# Patient Record
Sex: FEMALE | Race: WHITE | NOT HISPANIC OR LATINO | Employment: FULL TIME | URBAN - METROPOLITAN AREA
[De-identification: names, ages, dates, MRNs, and addresses within clinical notes are randomized per-mention and may not be internally consistent; named-entity substitution may affect disease eponyms.]

---

## 2017-01-23 ENCOUNTER — HOSPITAL ENCOUNTER (OUTPATIENT)
Facility: MEDICAL CENTER | Age: 27
End: 2017-01-23
Attending: PREVENTIVE MEDICINE
Payer: COMMERCIAL

## 2017-01-23 ENCOUNTER — EMPLOYEE HEALTH (OUTPATIENT)
Dept: OCCUPATIONAL MEDICINE | Facility: CLINIC | Age: 27
End: 2017-01-23

## 2017-01-23 ENCOUNTER — EH NON-PROVIDER (OUTPATIENT)
Dept: OCCUPATIONAL MEDICINE | Facility: CLINIC | Age: 27
End: 2017-01-23

## 2017-01-23 VITALS
HEART RATE: 100 BPM | BODY MASS INDEX: 18.96 KG/M2 | WEIGHT: 118 LBS | SYSTOLIC BLOOD PRESSURE: 108 MMHG | RESPIRATION RATE: 12 BRPM | TEMPERATURE: 98.1 F | HEIGHT: 66 IN | OXYGEN SATURATION: 97 % | DIASTOLIC BLOOD PRESSURE: 64 MMHG

## 2017-01-23 DIAGNOSIS — Z02.1 PHYSICAL EXAM, PRE-EMPLOYMENT: ICD-10-CM

## 2017-01-23 DIAGNOSIS — Z02.1 PRE-EMPLOYMENT DRUG SCREENING: ICD-10-CM

## 2017-01-23 DIAGNOSIS — Z02.1 ENCOUNTER FOR PRE-EMPLOYMENT HEALTH SCREENING EXAMINATION: ICD-10-CM

## 2017-01-23 LAB
AMP AMPHETAMINE: NORMAL
BAR BARBITURATES: NORMAL
BZO BENZODIAZEPINES: NORMAL
COC COCAINE: NORMAL
HBV CORE AB SERPL QL IA: NEGATIVE
HBV SURFACE AB SER RIA-ACNC: 250.19 MIU/ML (ref 0–10)
HBV SURFACE AG SERPL QL IA: NEGATIVE
INT CON NEG: NORMAL
INT CON POS: NORMAL
MDMA ECSTASY: NORMAL
MET METHAMPHETAMINES: NORMAL
MTD METHADONE: NORMAL
OPI OPIATES: NORMAL
OXY OXYCODONE: NORMAL
PCP PHENCYCLIDINE: NORMAL
POC URINE DRUG SCREEN OCDRS: NORMAL
RUBV IGG SERPL IA-ACNC: 15 IU/ML
THC: NORMAL

## 2017-01-23 PROCEDURE — 87340 HEPATITIS B SURFACE AG IA: CPT | Performed by: PREVENTIVE MEDICINE

## 2017-01-23 PROCEDURE — 94375 RESPIRATORY FLOW VOLUME LOOP: CPT | Performed by: PREVENTIVE MEDICINE

## 2017-01-23 PROCEDURE — 86735 MUMPS ANTIBODY: CPT | Performed by: PREVENTIVE MEDICINE

## 2017-01-23 PROCEDURE — 86765 RUBEOLA ANTIBODY: CPT | Performed by: PREVENTIVE MEDICINE

## 2017-01-23 PROCEDURE — 80305 DRUG TEST PRSMV DIR OPT OBS: CPT | Performed by: PREVENTIVE MEDICINE

## 2017-01-23 PROCEDURE — 99204 OFFICE O/P NEW MOD 45 MIN: CPT | Performed by: PREVENTIVE MEDICINE

## 2017-01-23 PROCEDURE — 86706 HEP B SURFACE ANTIBODY: CPT | Performed by: PREVENTIVE MEDICINE

## 2017-01-23 PROCEDURE — 86704 HEP B CORE ANTIBODY TOTAL: CPT | Performed by: PREVENTIVE MEDICINE

## 2017-01-23 PROCEDURE — 86787 VARICELLA-ZOSTER ANTIBODY: CPT | Performed by: PREVENTIVE MEDICINE

## 2017-01-23 PROCEDURE — 86480 TB TEST CELL IMMUN MEASURE: CPT | Performed by: PREVENTIVE MEDICINE

## 2017-01-23 PROCEDURE — 86762 RUBELLA ANTIBODY: CPT | Performed by: PREVENTIVE MEDICINE

## 2017-01-23 NOTE — MR AVS SNAPSHOT
Patricia Novoa   2017 10:20 AM   Employee Health   MRN: [id number]    Department:  [facility]   Dept Phone:  [phone]    Description:  Female : 1990   Provider:  Dl Steinberg D.O.           Allergies as of 2017     Not on File      You were diagnosed with     Physical exam, pre-employment   [810993]         Basic Information     Date Of Birth Sex Race Ethnicity Preferred Language    1990 Female White Non- English      Health Maintenance     Patient has no pending health maintenance at this time      Current Immunizations     No immunizations on file.      Below and/or attached are the medications your provider expects you to take. Review all of your home medications and newly ordered medications with your provider and/or pharmacist. Follow medication instructions as directed by your provider and/or pharmacist. Please keep your medication list with you and share with your provider. Update the information when medications are discontinued, doses are changed, or new medications (including over-the-counter products) are added; and carry medication information at all times in the event of emergency situations     Allergies:  (Not on file)          Medications  Valid as of: 2017 - 10:45 AM    Generic Name Brand Name Tablet Size Instructions for use    .                 Medicines prescribed today were sent to:     None      Medication refill instructions:       If your prescription bottle indicates you have medication refills left, it is not necessary to call your provider’s office. Please contact your pharmacy and they will refill your medication.    If your prescription bottle indicates you do not have any refills left, you may request refills at any time through one of the following ways: The online [employer] system (except Urgent Care), by calling your provider’s office, or by asking your pharmacy to contact your provider’s office with a refill request.  Medication refills are processed only during regular business hours and may not be available until the next business day. Your provider may request additional information or to have a follow-up visit with you prior to refilling your medication.   *Please Note: Medication refills are assigned a new Rx number when refilled electronically. Your pharmacy may indicate that no refills were authorized even though a new prescription for the same medication is available at the pharmacy. Please request the medicine by name with the pharmacy before contacting your provider for a refill.           Wayna Access Code: Activation code not generated  Current Wayna Status: Active

## 2017-01-25 LAB
M TB TUBERC IFN-G/MITOGEN IGNF BLD: -0
M TB TUBERC IGNF/MITOGEN IGNF CONTROL: 68.6 [IU]/ML
MEV IGG SER IA-ACNC: 76.7 AU/ML
MITOGEN IGNF BCKGRD COR BLD-ACNC: 0.04 [IU]/ML
MUV IGG SER IA-ACNC: 44.6 AU/ML
QUANT TB GOLD 86480: NEGATIVE
VZV IGG SER IA-ACNC: 219 IV

## 2017-02-08 LAB
ABO GROUP BLD: NORMAL
BLD GP AB SCN SERPL QL: NEGATIVE
HBV SURFACE AG SERPL QL IA: NEGATIVE
HCT VFR BLD AUTO: 41.9 %
HGB BLD-MCNC: 14.1 G/DL
HIV 1 0 2 IC ZHVIC: NON REACTIVE
RH BLD: POSITIVE
RPR SER QL: NON REACTIVE
RUBV IGG SERPL IA-ACNC: NORMAL

## 2017-02-09 LAB
C TRACH DNA SPEC QL NAA+PROBE: NEGATIVE
N GONORRHOEA DNA SPEC QL NAA+PROBE: NEGATIVE

## 2017-02-17 LAB — PHYSICIAN READ PAP  881411: NEGATIVE

## 2017-04-07 ENCOUNTER — INITIAL PRENATAL (OUTPATIENT)
Dept: OBGYN | Facility: MEDICAL CENTER | Age: 27
End: 2017-04-07
Payer: COMMERCIAL

## 2017-04-07 VITALS — SYSTOLIC BLOOD PRESSURE: 100 MMHG | WEIGHT: 135 LBS | DIASTOLIC BLOOD PRESSURE: 62 MMHG | BODY MASS INDEX: 21.8 KG/M2

## 2017-04-07 DIAGNOSIS — Z34.02 ENCOUNTER FOR SUPERVISION OF NORMAL FIRST PREGNANCY IN SECOND TRIMESTER: ICD-10-CM

## 2017-04-07 PROCEDURE — 59401 PR NEW OB VISIT: CPT | Performed by: OBSTETRICS & GYNECOLOGY

## 2017-04-07 NOTE — PROGRESS NOTES
Cc: New OB visit    HPI:  The patient is a 27 y.o.  19w2d based upon  Patient's last menstrual period was 2016. Pt received 2-3 visits from [facility] with 2 US and labs.    She presents for her new obstetric visit.  She reports  reports  fetal movement,  denies  vaginal bleeding,  denies  leakage of fluid,  denies contractions.   She denies nausea/vomiting, denies headache, and denies dysuria.      OB History    Para Term  AB SAB TAB Ectopic Multiple Living   1               # Outcome Date GA Lbr [place]/2nd Weight Sex Delivery Anes PTL Lv   1 Current                 History reviewed. No pertinent past medical history.  History reviewed. No pertinent past surgical history.  Social History     Social History   • Marital Status: Single     Spouse Name: N/A   • Number of Children: N/A   • Years of Education: N/A     Occupational History   • Not on file.     Social History Main Topics   • Smoking status: Not on file   • Smokeless tobacco: Not on file   • Alcohol Use: Not on file   • Drug Use: Not on file   • Sexual Activity: Not on file     Other Topics Concern   • Not on file     Social History Narrative   • No narrative on file     History reviewed. No pertinent family history.  Allergies:   Allergies as of 2017   • (No Known Allergies)       PE:    Blood pressure 100/62, weight 61.236 kg (135 lb), last menstrual period 2016.    SVE: deferred     see prenatal physical    LABS: Pending    A/P:  27 y.o.  19w2d based upon  Patient's last menstrual period was 2016..  She is here for her new obstetric visit.    1. Encounter for supervision of normal first pregnancy in second trimester  US-OB 2ND 3RD TRI COMPLETE       1. Ultrasound for dates and anatomy to be scheduled in 1-2 weeks.  2. 1st trimester screening for Down Syndrome and neural tube defects performed per patient report and will obtain records from [facility].  3.  PTL precautions  4.  NOB packet given with ACOG prenatal  book.  5.  Increase water intake and encouraged healthy nutrition.  6.  Referral to MFM made (if needed).  7.  Begin prenatal vitamins.  8.  Follow-up in 4 weeks.

## 2017-04-07 NOTE — PROGRESS NOTES
NOB today, pt is a transfer of care from [place]. Records have been requested today. Pt states has had pap GC/CT and PNP done.

## 2017-04-07 NOTE — MR AVS SNAPSHOT
Patricia Bright   2017 1:30 PM   Initial Prenatal   MRN: [id number]    Department:  [facility]   Dept Phone:  [phone]    Description:  Female : 1990   Provider:  Shikha Lawson M.D.           Allergies as of 2017     No Known Allergies      You were diagnosed with     Encounter for supervision of normal first pregnancy in second trimester   [[id number]]         Vital Signs     Blood Pressure Weight Last Menstrual Period             100/62 mmHg 61.236 kg (135 lb) 2016         Basic Information     Date Of Birth Sex Race Ethnicity Preferred Language    1990 Female White Non- English      Your appointments     May 04, 2017  1:45 PM   OB Follow Up with Shikha Lawson M.D.   [facility])    [address]  [place] [address] [phone]            2017  4:15 PM   OB Follow Up with Shikha Lawson M.D.   [facility])    [address]  [place] [address]   [phone]              Health Maintenance        Date Due Completion Dates    IMM HEP B VACCINE (1 of 3 - Primary Series) 1990 ---    IMM HEP A VACCINE (1 of 2 - Standard Series) 2/15/1992 ---    IMM HPV VACCINE (1 of 3 - Female 3 Dose Series) 2/15/2002 ---    IMM VARICELLA (CHICKENPOX) VACCINE (1 of 2 - 2 Dose Adolescent Series) 2/15/2004 ---    IMM DTaP/Tdap/Td Vaccine (1 - Tdap) 2/15/2010 ---    PAP SMEAR 2/15/2012 ---            Current Immunizations     Influenza TIV (IM) 10/23/2016      Below and/or attached are the medications your provider expects you to take. Review all of your home medications and newly ordered medications with your provider and/or pharmacist. Follow medication instructions as directed by your provider and/or pharmacist. Please keep your medication list with you and share with your provider. Update the information when medications are discontinued, doses are  changed, or new medications (including over-the-counter products) are added; and carry medication information at all times in the event of emergency situations     Allergies:  No Known Allergies          Medications  Valid as of: April 07, 2017 -  2:26 PM    Generic Name Brand Name Tablet Size Instructions for use    .                 Medicines prescribed today were sent to:     [id number] - [place], [place] - [address]    [address] [place] [address]    Phone: [phone] Fax: [phone]    Open 24 Hours?: No      Medication refill instructions:       If your prescription bottle indicates you have medication refills left, it is not necessary to call your provider’s office. Please contact your pharmacy and they will refill your medication.    If your prescription bottle indicates you do not have any refills left, you may request refills at any time through one of the following ways: The online [employer]il[employer] system (except Urgent Care), by calling your provider’s office, or by asking your pharmacy to contact your provider’s office with a refill request. Medication refills are processed only during regular business hours and may not be available until the next business day. Your provider may request additional information or to have a follow-up visit with you prior to refilling your medication.   *Please Note: Medication refills are assigned a new Rx number when refilled electronically. Your pharmacy may indicate that no refills were authorized even though a new prescription for the same medication is available at the pharmacy. Please request the medicine by name with the pharmacy before contacting your provider for a refill.        Your To Do List     Future Labs/Procedures Complete By Expires    US-OB 2ND 3RD TRI COMPLETE  As directed 10/6/2017         Agilvax Access Code: Activation code not generated  Current Agilvax Status: Active

## 2017-04-28 ENCOUNTER — DATING (OUTPATIENT)
Dept: OBGYN | Facility: MEDICAL CENTER | Age: 27
End: 2017-04-28

## 2017-04-28 ENCOUNTER — HOSPITAL ENCOUNTER (OUTPATIENT)
Dept: RADIOLOGY | Facility: MEDICAL CENTER | Age: 27
End: 2017-04-28
Attending: OBSTETRICS & GYNECOLOGY
Payer: COMMERCIAL

## 2017-04-28 DIAGNOSIS — Z34.02 ENCOUNTER FOR SUPERVISION OF NORMAL FIRST PREGNANCY IN SECOND TRIMESTER: ICD-10-CM

## 2017-04-28 PROCEDURE — 76805 OB US >/= 14 WKS SNGL FETUS: CPT

## 2017-05-04 ENCOUNTER — ROUTINE PRENATAL (OUTPATIENT)
Dept: OBGYN | Facility: MEDICAL CENTER | Age: 27
End: 2017-05-04
Payer: COMMERCIAL

## 2017-05-04 VITALS — DIASTOLIC BLOOD PRESSURE: 66 MMHG | WEIGHT: 140 LBS | BODY MASS INDEX: 22.61 KG/M2 | SYSTOLIC BLOOD PRESSURE: 100 MMHG

## 2017-05-04 DIAGNOSIS — Z34.82 ENCOUNTER FOR SUPERVISION OF OTHER NORMAL PREGNANCY IN SECOND TRIMESTER: ICD-10-CM

## 2017-05-04 PROCEDURE — 90040 PR PRENATAL FOLLOW UP: CPT | Performed by: OBSTETRICS & GYNECOLOGY

## 2017-05-04 NOTE — PROGRESS NOTES
Pt denies CTX, LOF, VB or any other c/o.  Good fetal movement.    Lab:No results found for this or any previous visit (from the past 672 hour(s)).    A/P:  27 y.o.  at 23w1d presents for obstetric follow-up.    1.  Continue prenatal vitamins.  2.  Begin/continue kick counts at 28 weeks   3.  Watch weight gain.  4.  Increase water intake.  5.  Follow-up in 4 weeks.  6.  PTL/labor precautions given

## 2017-05-04 NOTE — MR AVS SNAPSHOT
Patricia Novoa   2017 1:45 PM   Routine Prenatal   MRN: [id number]    Department:  [facility]   Dept Phone:  [phone]    Description:  Female : 1990   Provider:  Shikha Lawson M.D.           Allergies as of 2017     No Known Allergies      You were diagnosed with     Encounter for supervision of other normal pregnancy in second trimester   [[id number]]         Vital Signs     Blood Pressure Weight Last Menstrual Period             100/66 mmHg 63.504 kg (140 lb) 2016         Basic Information     Date Of Birth Sex Race Ethnicity Preferred Language    1990 Female White Non- English      Your appointments     2017  4:15 PM   OB Follow Up with Shikha Lawson M.D.   [facility]    [address]  [facility] [id number] [phone]              Health Maintenance        Date Due Completion Dates    IMM HEP B VACCINE (1 of 3 - Primary Series) 1990 ---    IMM HEP A VACCINE (1 of 2 - Standard Series) 2/15/1992 ---    IMM HPV VACCINE (1 of 3 - Female 3 Dose Series) 2/15/2002 ---    IMM VARICELLA (CHICKENPOX) VACCINE (1 of 2 - 2 Dose Adolescent Series) 2/15/2004 ---    IMM DTaP/Tdap/Td Vaccine (1 - Tdap) 2/15/2010 ---    PAP SMEAR 2/15/2012 ---            Current Immunizations     Influenza TIV (IM) 10/23/2016      Below and/or attached are the medications your provider expects you to take. Review all of your home medications and newly ordered medications with your provider and/or pharmacist. Follow medication instructions as directed by your provider and/or pharmacist. Please keep your medication list with you and share with your provider. Update the information when medications are discontinued, doses are changed, or new medications (including over-the-counter products) are added; and carry medication information at all times in the event of emergency situations     Allergies:  No Known Allergies             Medications  Valid as of: May 04, 2017 -  2:16 PM    Generic Name Brand Name Tablet Size Instructions for use    .                 Medicines prescribed today were sent to:     [facility] - [place], [place] - [address]    [address] [place] [address]    Phone: [phone] Fax: [phone]    Open 24 Hours?: No      Medication refill instructions:       If your prescription bottle indicates you have medication refills left, it is not necessary to call your provider’s office. Please contact your pharmacy and they will refill your medication.    If your prescription bottle indicates you do not have any refills left, you may request refills at any time through one of the following ways: The online [employer] system (except Urgent Care), by calling your provider’s office, or by asking your pharmacy to contact your provider’s office with a refill request. Medication refills are processed only during regular business hours and may not be available until the next business day. Your provider may request additional information or to have a follow-up visit with you prior to refilling your medication.   *Please Note: Medication refills are assigned a new Rx number when refilled electronically. Your pharmacy may indicate that no refills were authorized even though a new prescription for the same medication is available at the pharmacy. Please request the medicine by name with the pharmacy before contacting your provider for a refill.        Your To Do List     Future Labs/Procedures Complete By Expires    CBC WITHOUT DIFFERENTIAL  As directed 11/4/2017    GLUCOSE 1HR GESTATIONAL  As directed 5/5/2018    T.PALLIDUM AB EIA  As directed 5/5/2018      Instructions    PTL precautions          Geodruidhart Access Code: Activation code not generated  Current CyberSponse Status: Active

## 2017-05-12 ENCOUNTER — HOSPITAL ENCOUNTER (OUTPATIENT)
Dept: LAB | Facility: MEDICAL CENTER | Age: 27
End: 2017-05-12
Attending: OBSTETRICS & GYNECOLOGY
Payer: COMMERCIAL

## 2017-05-12 DIAGNOSIS — Z34.82 ENCOUNTER FOR SUPERVISION OF OTHER NORMAL PREGNANCY IN SECOND TRIMESTER: ICD-10-CM

## 2017-05-12 LAB
ERYTHROCYTE [DISTWIDTH] IN BLOOD BY AUTOMATED COUNT: 45.5 FL (ref 35.9–50)
GLUCOSE 1H P 50 G GLC PO SERPL-MCNC: 130 MG/DL (ref 70–139)
HCT VFR BLD AUTO: 35.8 % (ref 37–47)
HGB BLD-MCNC: 12.1 G/DL (ref 12–16)
MCH RBC QN AUTO: 31 PG (ref 27–33)
MCHC RBC AUTO-ENTMCNC: 33.8 G/DL (ref 33.6–35)
MCV RBC AUTO: 91.8 FL (ref 81.4–97.8)
PLATELET # BLD AUTO: 232 K/UL (ref 164–446)
PMV BLD AUTO: 9.9 FL (ref 9–12.9)
RBC # BLD AUTO: 3.9 M/UL (ref 4.2–5.4)
TREPONEMA PALLIDUM IGG+IGM AB [PRESENCE] IN SERUM OR PLASMA BY IMMUNOASSAY: NON REACTIVE
WBC # BLD AUTO: 7.8 K/UL (ref 4.8–10.8)

## 2017-05-12 PROCEDURE — 82950 GLUCOSE TEST: CPT

## 2017-05-12 PROCEDURE — 86780 TREPONEMA PALLIDUM: CPT

## 2017-05-12 PROCEDURE — 85027 COMPLETE CBC AUTOMATED: CPT

## 2017-05-12 PROCEDURE — 36415 COLL VENOUS BLD VENIPUNCTURE: CPT

## 2017-06-02 ENCOUNTER — ROUTINE PRENATAL (OUTPATIENT)
Dept: OBGYN | Facility: MEDICAL CENTER | Age: 27
End: 2017-06-02
Payer: COMMERCIAL

## 2017-06-02 VITALS — WEIGHT: 145 LBS | DIASTOLIC BLOOD PRESSURE: 60 MMHG | SYSTOLIC BLOOD PRESSURE: 100 MMHG | BODY MASS INDEX: 23.41 KG/M2

## 2017-06-02 DIAGNOSIS — Z34.03 ENCOUNTER FOR SUPERVISION OF NORMAL FIRST PREGNANCY IN THIRD TRIMESTER: ICD-10-CM

## 2017-06-02 PROCEDURE — 90040 PR PRENATAL FOLLOW UP: CPT | Performed by: OBSTETRICS & GYNECOLOGY

## 2017-06-02 RX ORDER — BREAST PUMP
1 EACH MISCELLANEOUS DAILY
Qty: 1 EACH | Refills: 0 | Status: SHIPPED | OUTPATIENT
Start: 2017-06-02

## 2017-06-02 NOTE — Clinical Note
"Count Your Baby's Movements  Another step to a healthy delivery    How Many Weeks Pregnant?  28 weeks   Date to Begin Countin17              How to use this chart    One way for your physician to keep track of your baby's health is by knowing how often the baby moves (or \"kicks\") in your womb.  You can help your physician to do this by using this chart every day.    Every day, you should see how many hours it takes for your baby to move 10 times.  Start in the morning, as soon as you get up.    · First, write down the time your baby moves until you get to 10.  · Check off one box every time your baby moves until you get to 10.  · Write down the time you finished counting in the last column.  · Total how long it took to count up all 10 movements.  · Finally, fill in the box that shows how long this took.  After counting 10 movements, you no longer have to count any more that day.  The next morning, just start counting again as soon as you get up.    What should you call a \"movement\"?  It is hard to say, because it will feel different from one mother to another and from one pregnancy to the next.  The important thing is that you count the movements the same way throughout your pregnancy.  If you have more questions, you should ask your physician.    Count carefully every day!  SAMPLE:  Week 28    How many hours did it take to feel 10 movements?       Start  Time     1     2     3     4     5     6     7     8     9     10   Finish Time   Mon 8:20 ·  ·  ·  ·  ·  ·  ·  ·  ·  ·  10:40   Tu               Fri               Sat               Sun                 IMPORTANT: You should contact your physician if it takes more than two hours for you to feel 10 movements.  Each morning, write down the time and start to count the movements of your baby.  Keep track by checking off one box every time you feel one movement.  When you have felt 10 \"kicks\", write down the time you finished " counting in the last column.  Then fill in the   box (over the check noah) for the number of hours it took.  Be sure to read the complete instructions on the previous page.

## 2017-06-02 NOTE — PROGRESS NOTES
Pt here today for OB follow up, good fetal movement, denies LOF, VB. Fetal movement sheet given today.

## 2017-06-02 NOTE — MR AVS SNAPSHOT
Patricia Bright   2017 4:15 PM   Routine Prenatal   MRN: [id number]    Department:  [facility]   Dept Phone:  [phone]    Description:  Female : 1990   Provider:  Shikha Lawson M.D.           Allergies as of 2017     No Known Allergies      You were diagnosed with     Lactating mother   [[id number]]         Vital Signs     Blood Pressure Weight Last Menstrual Period             100/60 mmHg 65.772 kg (145 lb) 2016         Basic Information     Date Of Birth Sex Race Ethnicity Preferred Language    1990 Female White Non- English      Your appointments     2017  3:45 PM   OB Follow Up with Shikha Lawson M.D.   [facility]    [address]  [place] [address]   [phone]            2017  4:00 PM   OB Follow Up with Shikha Lawson M.D.   [facility]    [address]  [place] [address]   [phone]            2017  4:15 PM   OB Follow Up with Shikha Lawson M.D.   [facility]    [address]  [place] [address]   [phone]            Aug 02, 2017 10:30 AM   OB Follow Up with Shikha Lawson M.D.   [facility]    [address]  [place] [address]   [phone]            Aug 10, 2017  4:15 PM   OB Follow Up with Shikha Lawson M.D.   [facility]    [address]  [place] [address]   [phone]            Aug 17, 2017  3:30 PM   OB Follow Up with Shikha Lawson M.D.   [facility])    [address]  [place] [address]   [phone]            Aug 23, 2017  4:00 PM   OB Follow Up with Shikha Lawson M.D.   [facility]    [address]  [place]  [place] [address]   [phone]              Health Maintenance        Date Due Completion Dates    IMM HEP B VACCINE (1 of 3 - Primary Series) 1990 ---    IMM HEP A VACCINE (1 of 2 - Standard Series) 2/15/1992 ---    IMM HPV VACCINE (1 of 3 - Female 3 Dose Series) 2/15/2002 ---    IMM VARICELLA (CHICKENPOX) VACCINE (1 of 2 - 2 Dose Adolescent Series) 2/15/2004 ---    IMM DTaP/Tdap/Td Vaccine (1 - Tdap) 2/15/2010 ---    PAP SMEAR 2/17/2020 2/17/2017, 2/7/2017 (Done)    Override on 2/7/2017: Done (mari - WNL)            Current Immunizations     Influenza TIV (IM) 10/23/2016      Below and/or attached are the medications your provider expects you to take. Review all of your home medications and newly ordered medications with your provider and/or pharmacist. Follow medication instructions as directed by your provider and/or pharmacist. Please keep your medication list with you and share with your provider. Update the information when medications are discontinued, doses are changed, or new medications (including over-the-counter products) are added; and carry medication information at all times in the event of emergency situations     Allergies:  No Known Allergies          Medications  Valid as of: June 02, 2017 -  4:54 PM    Generic Name Brand Name Tablet Size Instructions for use    Misc. Devices (Misc) Breast Pump  1 Each by Other route every day. Double Electric Pump, Medically Necessary   Z39.1        .                 Medicines prescribed today were sent to:     [id number]  [place], [place] - [address]    [address] [address]    Phone: [phone] Fax: [phone]    Open 24 Hours?: No      Medication refill instructions:       If your prescription bottle indicates you have medication refills left, it is not necessary to call your provider’s office. Please contact your pharmacy and they will refill your medication.    If your prescription bottle indicates you do not have any refills  left, you may request refills at any time through one of the following ways: The online Diana system (except Urgent Care), by calling your provider’s office, or by asking your pharmacy to contact your provider’s office with a refill request. Medication refills are processed only during regular business hours and may not be available until the next business day. Your provider may request additional information or to have a follow-up visit with you prior to refilling your medication.   *Please Note: Medication refills are assigned a new Rx number when refilled electronically. Your pharmacy may indicate that no refills were authorized even though a new prescription for the same medication is available at the pharmacy. Please request the medicine by name with the pharmacy before contacting your provider for a refill.           Diana Access Code: Activation code not generated  Current Diana Status: Active

## 2017-06-03 NOTE — PROGRESS NOTES
Pt denies CTX, LOF, VB or any other c/o.  Good fetal movement.    Lab:  Recent Results (from the past 672 hour(s))   GLUCOSE 1HR GESTATIONAL    Collection Time: 17 11:54 AM   Result Value Ref Range    Glucose, Post Dose 130 70 - 139 mg/dL   CBC WITHOUT DIFFERENTIAL    Collection Time: 17 11:54 AM   Result Value Ref Range    WBC 7.8 4.8 - 10.8 K/uL    RBC 3.90 (L) 4.20 - 5.40 M/uL    Hemoglobin 12.1 12.0 - 16.0 g/dL    Hematocrit 35.8 (L) 37.0 - 47.0 %    MCV 91.8 81.4 - 97.8 fL    MCH 31.0 27.0 - 33.0 pg    MCHC 33.8 33.6 - 35.0 g/dL    RDW 45.5 35.9 - 50.0 fL    Platelet Count 232 164 - 446 K/uL    MPV 9.9 9.0 - 12.9 fL   T.PALLIDUM AB EIA    Collection Time: 17 11:54 AM   Result Value Ref Range    Syphilis, Treponemal Qual Non Reactive Non Reactive       A/P:  27 y.o.  at 27w2d presents for obstetric follow-up.    1.  Continue prenatal vitamins.  2.  Begin/continue kick counts at 28 weeks   3.  Watch weight gain.  4.  Increase water intake.  5.  Follow-up in 2 weeks.  6.  PTL/labor precautions given

## 2017-06-29 ENCOUNTER — ROUTINE PRENATAL (OUTPATIENT)
Dept: OBGYN | Facility: MEDICAL CENTER | Age: 27
End: 2017-06-29
Payer: COMMERCIAL

## 2017-06-29 VITALS — WEIGHT: 149 LBS | SYSTOLIC BLOOD PRESSURE: 100 MMHG | DIASTOLIC BLOOD PRESSURE: 70 MMHG | BODY MASS INDEX: 24.06 KG/M2

## 2017-06-29 DIAGNOSIS — Z34.03 ENCOUNTER FOR SUPERVISION OF NORMAL FIRST PREGNANCY IN THIRD TRIMESTER: ICD-10-CM

## 2017-06-29 PROCEDURE — 90040 PR PRENATAL FOLLOW UP: CPT | Performed by: OBSTETRICS & GYNECOLOGY

## 2017-06-29 NOTE — MR AVS SNAPSHOT
Patricia Bright   2017 3:45 PM   Routine Prenatal   MRN: [id number]    Department:  [facility]   Dept Phone:  [phone]    Description:  Female : 1990   Provider:  Shikha Lawson M.D.           Allergies as of 2017     No Known Allergies      You were diagnosed with     Encounter for supervision of normal first pregnancy in third trimester   [207641]         Vital Signs     Blood Pressure Weight Last Menstrual Period             100/70 mmHg 67.586 kg (149 lb) 2016         Basic Information     Date Of Birth Sex Race Ethnicity Preferred Language    1990 Female White Non- English      Your appointments     2017  4:00 PM   OB Follow Up with Shikha Lawson M.D.   [facility])    [address]  [place] [address]   [phone]            2017  3:15 PM   OB Follow Up with Anh Valerio M.D.   [address], [address]  [place] [address]   [phone]            Aug 02, 2017 10:30 AM   OB Follow Up with Shikha Lawson M.D.   [facility]    [address]  [place] [address]   [phone]            Aug 10, 2017  8:00 AM   OB Follow Up with Shikha Lawson M.D.   [facility]    [address]  [place] [address]   [phone]            Aug 17, 2017  3:30 PM   OB Follow Up with Shikha Lawson M.D.   [facility])    [address]  [place] [address]   [phone]            Aug 23, 2017  4:00 PM   OB Follow Up with Shikah Lawson M.D.   [facility]    [address]  [place] [address]   [phone]              Problem List              ICD-10-CM Priority Class Noted - Resolved    Encounter for supervision of  normal first pregnancy in third trimester Z34.03   6/2/2017 - Present      Health Maintenance        Date Due Completion Dates    IMM HEP B VACCINE (1 of 3 - Primary Series) 1990 ---    IMM HEP A VACCINE (1 of 2 - Standard Series) 2/15/1992 ---    IMM HPV VACCINE (1 of 3 - Female 3 Dose Series) 2/15/2002 ---    IMM VARICELLA (CHICKENPOX) VACCINE (1 of 2 - 2 Dose Adolescent Series) 2/15/2004 ---    IMM DTaP/Tdap/Td Vaccine (1 - Tdap) 2/15/2010 ---    PAP SMEAR 2/17/2020 2/17/2017, 2/7/2017 (Done)    Override on 2/7/2017: Done (mari - WNL)            Current Immunizations     Influenza TIV (IM) 10/23/2016      Below and/or attached are the medications your provider expects you to take. Review all of your home medications and newly ordered medications with your provider and/or pharmacist. Follow medication instructions as directed by your provider and/or pharmacist. Please keep your medication list with you and share with your provider. Update the information when medications are discontinued, doses are changed, or new medications (including over-the-counter products) are added; and carry medication information at all times in the event of emergency situations     Allergies:  No Known Allergies          Medications  Valid as of: June 29, 2017 -  4:07 PM    Generic Name Brand Name Tablet Size Instructions for use    Misc. Devices (Misc) Breast Pump  1 Each by Other route every day. Double Electric Pump, Medically Necessary   Z39.1        .                 Medicines prescribed today were sent to:     [facility]  [place], [place] - [address] [facility] [address] [facility]    [address] [facility] [address]    Phone: [phone] Fax: [phone]    Open 24 Hours?: No      Medication refill instructions:       If your prescription bottle indicates you have medication refills left, it is not necessary to call your provider’s office. Please contact your pharmacy and they will refill your medication.    If your prescription bottle  indicates you do not have any refills left, you may request refills at any time through one of the following ways: The online [employer] system (except Urgent Care), by calling your provider’s office, or by asking your pharmacy to contact your provider’s office with a refill request. Medication refills are processed only during regular business hours and may not be available until the next business day. Your provider may request additional information or to have a follow-up visit with you prior to refilling your medication.   *Please Note: Medication refills are assigned a new Rx number when refilled electronically. Your pharmacy may indicate that no refills were authorized even though a new prescription for the same medication is available at the pharmacy. Please request the medicine by name with the pharmacy before contacting your provider for a refill.        Instructions    PTL precautions          [employer] Access Code: Activation code not generated  Current [employer] Status: Active

## 2017-06-29 NOTE — PROGRESS NOTES
Pt denies CTX, LOF, VB or any other c/o.  Good fetal movement.    Lab:No results found for this or any previous visit (from the past 672 hour(s)).    A/P:  27 y.o.  at 31w1d presents for obstetric follow-up.    1.  Continue prenatal vitamins.  2.  Begin/continue kick counts at 28 weeks   3.  Watch weight gain.  4.  Increase water intake.  5.  Follow-up in 2 weeks.  6.  PTL/labor precautions given

## 2017-07-14 ENCOUNTER — ROUTINE PRENATAL (OUTPATIENT)
Dept: OBGYN | Facility: MEDICAL CENTER | Age: 27
End: 2017-07-14
Payer: COMMERCIAL

## 2017-07-14 VITALS — BODY MASS INDEX: 24.22 KG/M2 | SYSTOLIC BLOOD PRESSURE: 100 MMHG | DIASTOLIC BLOOD PRESSURE: 58 MMHG | WEIGHT: 150 LBS

## 2017-07-14 DIAGNOSIS — Z34.93 NORMAL PREGNANCY, THIRD TRIMESTER: ICD-10-CM

## 2017-07-14 PROBLEM — Z34.90 NORMAL PREGNANCY: Status: ACTIVE | Noted: 2017-07-14

## 2017-07-14 PROCEDURE — 90040 PR PRENATAL FOLLOW UP: CPT | Performed by: OBSTETRICS & GYNECOLOGY

## 2017-07-14 NOTE — PROGRESS NOTES
Pt denies CTX, LOF, VB or any other c/o.  Good fetal movement.    Lab:No results found for this or any previous visit (from the past 672 hour(s)).    A/P:  27 y.o.  at 33w2d presents for obstetric follow-up.    1.  Continue prenatal vitamins.  2.  Begin/continue kick counts at 28 weeks   3.  Watch weight gain.  4.  Increase water intake.  5.  Follow-up in 2 weeks.  6.  PTL/labor precautions given

## 2017-07-14 NOTE — MR AVS SNAPSHOT
Patricia Bright   2017 4:00 PM   Routine Prenatal   MRN: [id number]    Department:  [facility]   Dept Phone:  [phone]    Description:  Female : 1990   Provider:  Shikha Lawson M.D.           Allergies as of 2017     No Known Allergies      You were diagnosed with     Normal pregnancy, third trimester   [[id number]]         Vital Signs     Blood Pressure Weight Last Menstrual Period             100/58 mmHg 68.04 kg (150 lb) 2016         Basic Information     Date Of Birth Sex Race Ethnicity Preferred Language    1990 Female White Non- English      Your appointments     2017  3:15 PM   OB Follow Up with Anh Valerio M.D.   [address]  [place] [address]   [phone]            Aug 02, 2017 10:30 AM   OB Follow Up with Shikha Lawson M.D.   [facility]    [address]  [place] [address]   [phone]            Aug 17, 2017  3:30 PM   OB Follow Up with Shikha Lawson M.D.   [facility]    [address]  [place] [address]   [phone]            Aug 23, 2017  4:00 PM   OB Follow Up with Shikha Lawson M.D.   [facility]    [address]  [place] [address]   [phone]              Problem List              ICD-10-CM Priority Class Noted - Resolved    Encounter for supervision of normal first pregnancy in third trimester Z34.03   2017 - Present    Normal pregnancy Z34.90   2017 - Present      Health Maintenance        Date Due Completion Dates    IMM HEP B VACCINE (1 of 3 - Primary Series) 1990 ---    IMM HEP A VACCINE (1 of 2 - Standard Series) 2/15/1992 ---    IMM HPV VACCINE (1 of 3 - Female 3 Dose Series) 2/15/2002 ---    IMM VARICELLA (CHICKENPOX) VACCINE (1 of 2 - 2 Dose Adolescent  Series) 2/15/2004 ---    IMM DTaP/Tdap/Td Vaccine (1 - Tdap) 2/15/2010 ---    IMM INFLUENZA (1) 9/1/2017 10/23/2016    PAP SMEAR 2/17/2020 2/17/2017, 2/7/2017 (Done)    Override on 2/7/2017: Done (mari - WNL)            Current Immunizations     Influenza TIV (IM) 10/23/2016      Below and/or attached are the medications your provider expects you to take. Review all of your home medications and newly ordered medications with your provider and/or pharmacist. Follow medication instructions as directed by your provider and/or pharmacist. Please keep your medication list with you and share with your provider. Update the information when medications are discontinued, doses are changed, or new medications (including over-the-counter products) are added; and carry medication information at all times in the event of emergency situations     Allergies:  No Known Allergies          Medications  Valid as of: July 14, 2017 -  4:27 PM    Generic Name Brand Name Tablet Size Instructions for use    Misc. Devices (Misc) Breast Pump  1 Each by Other route every day. Double Electric Pump, Medically Necessary   Z39.1        .                 Medicines prescribed today were sent to:     [facility] [place] - [address] [facility] [facility]    [address] [facility] [place] [address]    Phone: [phone] Fax: [phone]    Open 24 Hours?: No      Medication refill instructions:       If your prescription bottle indicates you have medication refills left, it is not necessary to call your provider’s office. Please contact your pharmacy and they will refill your medication.    If your prescription bottle indicates you do not have any refills left, you may request refills at any time through one of the following ways: The online [employer] system (except Urgent Care), by calling your provider’s office, or by asking your pharmacy to contact your provider’s office with a refill request. Medication refills are processed only during regular business  hours and may not be available until the next business day. Your provider may request additional information or to have a follow-up visit with you prior to refilling your medication.   *Please Note: Medication refills are assigned a new Rx number when refilled electronically. Your pharmacy may indicate that no refills were authorized even though a new prescription for the same medication is available at the pharmacy. Please request the medicine by name with the pharmacy before contacting your provider for a refill.        Instructions    PTL precautions          [employer] Access Code: Activation code not generated  Current [employer] Status: Active

## 2017-07-28 ENCOUNTER — HOSPITAL ENCOUNTER (OUTPATIENT)
Facility: MEDICAL CENTER | Age: 27
End: 2017-07-28
Attending: OBSTETRICS & GYNECOLOGY
Payer: COMMERCIAL

## 2017-07-28 ENCOUNTER — ROUTINE PRENATAL (OUTPATIENT)
Dept: OBGYN | Facility: CLINIC | Age: 27
End: 2017-07-28
Payer: COMMERCIAL

## 2017-07-28 VITALS
DIASTOLIC BLOOD PRESSURE: 60 MMHG | BODY MASS INDEX: 24.78 KG/M2 | HEIGHT: 66 IN | WEIGHT: 154.2 LBS | SYSTOLIC BLOOD PRESSURE: 100 MMHG

## 2017-07-28 DIAGNOSIS — Z34.03 SUPERVISION OF NORMAL FIRST PREGNANCY IN THIRD TRIMESTER: ICD-10-CM

## 2017-07-28 PROCEDURE — 87653 STREP B DNA AMP PROBE: CPT

## 2017-07-28 PROCEDURE — 90040 PR PRENATAL FOLLOW UP: CPT | Performed by: OBSTETRICS & GYNECOLOGY

## 2017-07-28 NOTE — MR AVS SNAPSHOT
"        Patricia Bright   2017 3:15 PM   Routine Prenatal   MRN: [id number]    Department:  [facility]   Dept Phone:  [phone]    Description:  Female : 1990   Provider:  Hayder Ordaz M.D.           Allergies as of 2017     No Known Allergies      You were diagnosed with     Supervision of normal first pregnancy in third trimester   [[id number]]         Vital Signs     Blood Pressure Height Weight Body Mass Index Last Menstrual Period       100/60 mmHg 1.676 m (5' 6\") 69.945 kg (154 lb 3.2 oz) 24.90 kg/m2 2016       Basic Information     Date Of Birth Sex Race Ethnicity Preferred Language    1990 Female White Non- English      Your appointments     Aug 02, 2017 10:30 AM   OB Follow Up with Shikha Lawson M.D.   [facility])    [address]  [place] [address]   [phone]            Aug 17, 2017  3:30 PM   OB Follow Up with Shikha Lawson M.D.   [facility])    [address]  [place] [address] [phone]            Aug 23, 2017  4:00 PM   OB Follow Up with Shikha Lawson M.D.   [facility]    [address]  [place] [address] [phone]              Problem List              ICD-10-CM Priority Class Noted - Resolved    Encounter for supervision of normal first pregnancy in third trimester Z34.03   2017 - Present    Normal pregnancy Z34.90   2017 - Present      Health Maintenance        Date Due Completion Dates    IMM HEP B VACCINE (1 of 3 - Primary Series) 1990 ---    IMM HEP A VACCINE (1 of 2 - Standard Series) 2/15/1992 ---    IMM HPV VACCINE (1 of 3 - Female 3 Dose Series) 2/15/2002 ---    IMM VARICELLA (CHICKENPOX) VACCINE (1 of 2 - 2 Dose Adolescent Series) 2/15/2004 ---    IMM DTaP/Tdap/Td Vaccine (1 - Tdap) 2/15/2010 ---    IMM INFLUENZA (1) 2017 10/23/2016  "    PAP SMEAR 2/17/2020 2/17/2017, 2/7/2017 (Done)    Override on 2/7/2017: Done (mari - WNL)            Current Immunizations     Influenza TIV (IM) 10/23/2016      Below and/or attached are the medications your provider expects you to take. Review all of your home medications and newly ordered medications with your provider and/or pharmacist. Follow medication instructions as directed by your provider and/or pharmacist. Please keep your medication list with you and share with your provider. Update the information when medications are discontinued, doses are changed, or new medications (including over-the-counter products) are added; and carry medication information at all times in the event of emergency situations     Allergies:  No Known Allergies          Medications  Valid as of: July 28, 2017 -  4:08 PM    Generic Name Brand Name Tablet Size Instructions for use    Misc. Devices (Misc) Breast Pump  1 Each by Other route every day. Double Electric Pump, Medically Necessary   Z39.1        .                 Medicines prescribed today were sent to:     [facility] [address] [facility], [place] - [address] [facility]    [address] [facility] [place] [address]    Phone: [phone] Fax: [phone]    Open 24 Hours?: No      Medication refill instructions:       If your prescription bottle indicates you have medication refills left, it is not necessary to call your provider’s office. Please contact your pharmacy and they will refill your medication.    If your prescription bottle indicates you do not have any refills left, you may request refills at any time through one of the following ways: The online [email or web address] system (except Urgent Care), by calling your provider’s office, or by asking your pharmacy to contact your provider’s office with a refill request. Medication refills are processed only during regular business hours and may not be available until the next business day. Your provider may request additional information or to  have a follow-up visit with you prior to refilling your medication.   *Please Note: Medication refills are assigned a new Rx number when refilled electronically. Your pharmacy may indicate that no refills were authorized even though a new prescription for the same medication is available at the pharmacy. Please request the medicine by name with the pharmacy before contacting your provider for a refill.        Your To Do List     Future Labs/Procedures Complete By Expires    GRP B STREP, BY PCR (ZAPATA BROTH)  As directed 7/28/2018      Other Notes About Your Plan     Breast Feeding   Epidural : possible            MyChart Access Code: Activation code not generated  Current MyChart Status: Active

## 2017-07-28 NOTE — PROGRESS NOTES
"Patient is at 35w2d .no complaints  Fetal Movement : positive       Patients' weight gain, fluid intake and exercise level discussed.Vitals, fundal height , fetal position, and FHR reviewed on flowsheet.    .../60 mmHg  Ht 1.676 m (5' 6\")  Wt 69.945 kg (154 lb 3.2 oz)  BMI 24.90 kg/m2  LMP 11/23/2016  History reviewed. No pertinent past medical history.  Patient Active Problem List    Diagnosis Date Noted   • Normal pregnancy 07/14/2017   • Encounter for supervision of normal first pregnancy in third trimester 06/02/2017         Lab:No results found for this or any previous visit (from the past 336 hour(s)).    Assessment:  1  35w2d  2. . Doing well  3. Size equals Dates and/or Scan  4. Weight gain: normal: No, excessive:Yes                        Plan.  1. Rediscuss diet.  2. Increase water intake PRN  3. Continue vitamins.  4. Kick counts as instructed.  5. Discuss support hose and proper shoe wear as indicated.  6. GBS   "

## 2017-07-29 DIAGNOSIS — Z34.03 SUPERVISION OF NORMAL FIRST PREGNANCY IN THIRD TRIMESTER: ICD-10-CM

## 2017-07-30 LAB — GP B STREP DNA SPEC QL NAA+PROBE: NEGATIVE

## 2017-08-02 ENCOUNTER — ROUTINE PRENATAL (OUTPATIENT)
Dept: OBGYN | Facility: MEDICAL CENTER | Age: 27
End: 2017-08-02
Payer: COMMERCIAL

## 2017-08-02 VITALS
SYSTOLIC BLOOD PRESSURE: 100 MMHG | DIASTOLIC BLOOD PRESSURE: 58 MMHG | HEIGHT: 66 IN | BODY MASS INDEX: 24.64 KG/M2 | WEIGHT: 153.3 LBS

## 2017-08-02 DIAGNOSIS — Z34.93 NORMAL PREGNANCY, THIRD TRIMESTER: ICD-10-CM

## 2017-08-02 PROCEDURE — 90040 PR PRENATAL FOLLOW UP: CPT | Performed by: OBSTETRICS & GYNECOLOGY

## 2017-08-02 NOTE — MR AVS SNAPSHOT
"        Patricia Bright   2017 10:30 AM   Routine Prenatal   MRN: [id number]    Department:  [facility]   Dept Phone:  [phone]    Description:  Female : 1990   Provider:  Shikha Lawson M.D.           Allergies as of 2017     No Known Allergies      You were diagnosed with     Normal pregnancy, third trimester   [[id number]]         Vital Signs     Blood Pressure Height Weight Body Mass Index Last Menstrual Period       100/58 mmHg 1.676 m (5' 6\") 69.536 kg (153 lb 4.8 oz) 24.75 kg/m2 2016       Basic Information     Date Of Birth Sex Race Ethnicity Preferred Language    1990 Female White Non- English      Your appointments     Aug 11, 2017  2:15 PM   OB Follow Up with Brady Cottrell M.D.   [address], [address]  [place] [address]   [phone]            Aug 17, 2017  3:30 PM   OB Follow Up with Shikha Lawson M.D.   [facility])    [address]  [place] [address] [phone]            Aug 23, 2017  4:00 PM   OB Follow Up with Shikha Lawson M.D.   [facility])    [address]  [place] [address] [phone]              Problem List              ICD-10-CM Priority Class Noted - Resolved    Encounter for supervision of normal first pregnancy in third trimester Z34.03   2017 - Present    Normal pregnancy Z34.90   2017 - Present      Health Maintenance        Date Due Completion Dates    IMM HEP B VACCINE (1 of 3 - Primary Series) 1990 ---    IMM HEP A VACCINE (1 of 2 - Standard Series) 2/15/1992 ---    IMM HPV VACCINE (1 of 3 - Female 3 Dose Series) 2/15/2002 ---    IMM VARICELLA (CHICKENPOX) VACCINE (1 of 2 - 2 Dose Adolescent Series) 2/15/2004 ---    IMM DTaP/Tdap/Td Vaccine (1 - Tdap) 2/15/2010 ---    IMM INFLUENZA (1) 2017 10/23/2016    PAP SMEAR 2020, " 2/7/2017 (Done)    Override on 2/7/2017: Done (mari - WNL)            Current Immunizations     Influenza TIV (IM) 10/23/2016      Below and/or attached are the medications your provider expects you to take. Review all of your home medications and newly ordered medications with your provider and/or pharmacist. Follow medication instructions as directed by your provider and/or pharmacist. Please keep your medication list with you and share with your provider. Update the information when medications are discontinued, doses are changed, or new medications (including over-the-counter products) are added; and carry medication information at all times in the event of emergency situations     Allergies:  No Known Allergies          Medications  Valid as of: August 02, 2017 - 11:14 AM    Generic Name Brand Name Tablet Size Instructions for use    Misc. Devices (Misc) Breast Pump  1 Each by Other route every day. Double Electric Pump, Medically Necessary   Z39.1        .                 Medicines prescribed today were sent to:     [facility] - [place], [place] - [address] [facility]    [address] [facility] [place] [address]    Phone: [phone] Fax: [phone]    Open 24 Hours?: No      Medication refill instructions:       If your prescription bottle indicates you have medication refills left, it is not necessary to call your provider’s office. Please contact your pharmacy and they will refill your medication.    If your prescription bottle indicates you do not have any refills left, you may request refills at any time through one of the following ways: The online [employer] system (except Urgent Care), by calling your provider’s office, or by asking your pharmacy to contact your provider’s office with a refill request. Medication refills are processed only during regular business hours and may not be available until the next business day. Your provider may request additional information or to have a follow-up visit with you  prior to refilling your medication.   *Please Note: Medication refills are assigned a new Rx number when refilled electronically. Your pharmacy may indicate that no refills were authorized even though a new prescription for the same medication is available at the pharmacy. Please request the medicine by name with the pharmacy before contacting your provider for a refill.        Instructions    PTL precautions       Other Notes About Your Plan     Breast Feeding   Epidural : possible            MyChart Access Code: Activation code not generated  Current MyChart Status: Active

## 2017-08-02 NOTE — PROGRESS NOTES
Pt denies CTX, LOF, VB or any other c/o.  Good fetal movement.    Lab:  Recent Results (from the past 672 hour(s))   GRP B STREP, BY PCR (ZAPATA BROTH)    Collection Time: 17  4:08 PM   Result Value Ref Range    Strep Gp B DNA PCR Negative Negative       A/P:  27 y.o.  at 36w0d presents for obstetric follow-up.    1.  Continue prenatal vitamins.  2.  Begin/continue kick counts at 28 weeks   3.  Watch weight gain.  4.  Increase water intake.  5.  Follow-up in 1 weeks.  6.  PTL/labor precautions given

## 2017-08-11 ENCOUNTER — ROUTINE PRENATAL (OUTPATIENT)
Dept: OBGYN | Facility: CLINIC | Age: 27
End: 2017-08-11
Payer: COMMERCIAL

## 2017-08-11 VITALS — DIASTOLIC BLOOD PRESSURE: 68 MMHG | BODY MASS INDEX: 25.19 KG/M2 | WEIGHT: 156 LBS | SYSTOLIC BLOOD PRESSURE: 110 MMHG

## 2017-08-11 DIAGNOSIS — Z34.93 NORMAL PREGNANCY, THIRD TRIMESTER: ICD-10-CM

## 2017-08-11 PROCEDURE — 90040 PR PRENATAL FOLLOW UP: CPT | Performed by: OBSTETRICS & GYNECOLOGY

## 2017-08-11 NOTE — PROGRESS NOTES
OB Followup;    37w2d    Patient Active Problem List    Diagnosis Date Noted   • Normal pregnancy 07/14/2017   • Encounter for supervision of normal first pregnancy in third trimester 06/02/2017       Filed Vitals:    08/11/17 1429   BP: 110/68   Weight: 70.761 kg (156 lb)       Patient presents for followup of OB care. Currently doing well . Good fetal movement no leakage of fluid no contractions or vaginal bleeding        Size equals dates, normal fetal heart rate      Labs; GBS negative    Labor precautions given  Increase oral hydration    Followup in  1 weeks

## 2017-08-11 NOTE — MR AVS SNAPSHOT
Patricia Bright   2017 2:15 PM   Routine Prenatal   MRN: [id number]    Department:  [facility]   Dept Phone:  [phone]    Description:  Female : 1990   Provider:  Brady Cottrell M.D.           Allergies as of 2017     No Known Allergies      You were diagnosed with     Normal pregnancy, third trimester   [5896155]         Vital Signs     Blood Pressure Weight Last Menstrual Period             110/68 mmHg 70.761 kg (156 lb) 2016         Basic Information     Date Of Birth Sex Race Ethnicity Preferred Language    1990 Female White Non- English      Your appointments     Aug 17, 2017  3:30 PM   OB Follow Up with Shikha Lawson M.D.   [facility])    [address]  [place] [address] [phone]            Aug 23, 2017  4:00 PM   OB Follow Up with Shikha Lawson M.D.   [facility])    [address]  [place] [address] [phone]              Problem List              ICD-10-CM Priority Class Noted - Resolved    Encounter for supervision of normal first pregnancy in third trimester Z34.03   2017 - Present    Normal pregnancy Z34.90   2017 - Present      Health Maintenance        Date Due Completion Dates    IMM HEP B VACCINE (1 of 3 - Primary Series) 1990 ---    IMM HEP A VACCINE (1 of 2 - Standard Series) 2/15/1992 ---    IMM HPV VACCINE (1 of 3 - Female 3 Dose Series) 2/15/2002 ---    IMM VARICELLA (CHICKENPOX) VACCINE (1 of 2 - 2 Dose Adolescent Series) 2/15/2004 ---    IMM DTaP/Tdap/Td Vaccine (1 - Tdap) 2/15/2010 ---    IMM INFLUENZA (1) 2017 10/23/2016    PAP SMEAR 2020, 2017 (Done)    Override on 2017: Done (mari - WNL)            Current Immunizations     Influenza TIV (IM) 10/23/2016      Below and/or attached are the medications your provider expects you to take. Review all of your  home medications and newly ordered medications with your provider and/or pharmacist. Follow medication instructions as directed by your provider and/or pharmacist. Please keep your medication list with you and share with your provider. Update the information when medications are discontinued, doses are changed, or new medications (including over-the-counter products) are added; and carry medication information at all times in the event of emergency situations     Allergies:  No Known Allergies          Medications  Valid as of: August 11, 2017 -  2:55 PM    Generic Name Brand Name Tablet Size Instructions for use    Misc. Devices (Misc) Breast Pump  1 Each by Other route every day. Double Electric Pump, Medically Necessary   Z39.1        .                 Medicines prescribed today were sent to:     [facility] - [place], [place] - [address] [facility]    [address] [place] [address]    Phone: [phone] Fax: [phone]    Open 24 Hours?: No      Medication refill instructions:       If your prescription bottle indicates you have medication refills left, it is not necessary to call your provider’s office. Please contact your pharmacy and they will refill your medication.    If your prescription bottle indicates you do not have any refills left, you may request refills at any time through one of the following ways: The online [employer] system (except Urgent Care), by calling your provider’s office, or by asking your pharmacy to contact your provider’s office with a refill request. Medication refills are processed only during regular business hours and may not be available until the next business day. Your provider may request additional information or to have a follow-up visit with you prior to refilling your medication.   *Please Note: Medication refills are assigned a new Rx number when refilled electronically. Your pharmacy may indicate that no refills were authorized even though a new prescription for the same  medication is available at the pharmacy. Please request the medicine by name with the pharmacy before contacting your provider for a refill.        Other Notes About Your Plan     Breast Feeding   Epidural : possible            MyChart Access Code: Activation code not generated  Current MyChart Status: Active

## 2017-08-17 ENCOUNTER — ROUTINE PRENATAL (OUTPATIENT)
Dept: OBGYN | Facility: MEDICAL CENTER | Age: 27
End: 2017-08-17
Payer: COMMERCIAL

## 2017-08-17 VITALS — DIASTOLIC BLOOD PRESSURE: 66 MMHG | WEIGHT: 155 LBS | SYSTOLIC BLOOD PRESSURE: 104 MMHG | BODY MASS INDEX: 25.03 KG/M2

## 2017-08-17 DIAGNOSIS — Z34.03 ENCOUNTER FOR SUPERVISION OF NORMAL FIRST PREGNANCY IN THIRD TRIMESTER: ICD-10-CM

## 2017-08-17 PROCEDURE — 90040 PR PRENATAL FOLLOW UP: CPT | Performed by: OBSTETRICS & GYNECOLOGY

## 2017-08-17 NOTE — PROGRESS NOTES
Pt denies CTX, LOF, VB or any other c/o.  Good fetal movement.    Lab:  Recent Results (from the past 672 hour(s))   GRP B STREP, BY PCR (ZAPATA BROTH)    Collection Time: 17  4:08 PM   Result Value Ref Range    Strep Gp B DNA PCR Negative Negative       A/P:  27 y.o.  at 38w1d presents for obstetric follow-up.    1.  Continue prenatal vitamins.  2.  Begin/continue kick counts at 28 weeks   3.  Watch weight gain.  4.  Increase water intake.  5.  Follow-up in 1 weeks.  6.  PTL/labor precautions given

## 2017-08-17 NOTE — Clinical Note
August 17, 2017       Patient: Patricia Novoa   YOB: 1990   Date of Visit: 8/17/2017         To Whom It May Concern:    Patricia Novoa desires to start her maternity leave 8/17/17.    If you have any questions or concerns, please don't hesitate to call [phone]          Sincerely,          Shikha Lawson M.D.  Electronically Signed

## 2017-08-17 NOTE — MR AVS SNAPSHOT
Patricia Novoa   2017 3:30 PM   Routine Prenatal   MRN: [id number]    Department:  [facility]   Dept Phone:  [phone]    Description:  Female : 1990   Provider:  Shikha Lawson M.D.           Allergies as of 2017     No Known Allergies      You were diagnosed with     Encounter for supervision of normal first pregnancy in third trimester   [[id number]]         Vital Signs     Blood Pressure Weight Last Menstrual Period             104/66 mmHg 70.308 kg (155 lb) 2016         Basic Information     Date Of Birth Sex Race Ethnicity Preferred Language    1990 Female White Non- English      Your appointments     Aug 23, 2017  4:00 PM   OB Follow Up with Shikha Lawson M.D.   [facility]    [address]  [facility] [id number] [phone]              Problem List              ICD-10-CM Priority Class Noted - Resolved    Encounter for supervision of normal first pregnancy in third trimester Z34.03   2017 - Present    Normal pregnancy Z34.90   2017 - Present      Health Maintenance        Date Due Completion Dates    IMM HEP B VACCINE (1 of 3 - Primary Series) 1990 ---    IMM HEP A VACCINE (1 of 2 - Standard Series) 2/15/1992 ---    IMM HPV VACCINE (1 of 3 - Female 3 Dose Series) 2/15/2002 ---    IMM VARICELLA (CHICKENPOX) VACCINE (1 of 2 - 2 Dose Adolescent Series) 2/15/2004 ---    IMM DTaP/Tdap/Td Vaccine (1 - Tdap) 2/15/2010 ---    IMM INFLUENZA (1) 2017 10/23/2016    PAP SMEAR 2020, 2017 (Done)    Override on 2017: Done (mari - WNL)            Current Immunizations     Influenza TIV (IM) 10/23/2016      Below and/or attached are the medications your provider expects you to take. Review all of your home medications and newly ordered medications with your provider and/or pharmacist. Follow medication instructions as directed by your provider and/or pharmacist. Please  keep your medication list with you and share with your provider. Update the information when medications are discontinued, doses are changed, or new medications (including over-the-counter products) are added; and carry medication information at all times in the event of emergency situations     Allergies:  No Known Allergies          Medications  Valid as of: August 17, 2017 -  4:19 PM    Generic Name Brand Name Tablet Size Instructions for use    Misc. Devices (Misc) Breast Pump  1 Each by Other route every day. Double Electric Pump, Medically Necessary   Z39.1        .                 Medicines prescribed today were sent to:     [facility] - [place], [place] - [address] [facility]    [address] [facility] [place] [address]    Phone: [phone] Fax: [phone]    Open 24 Hours?: No      Medication refill instructions:       If your prescription bottle indicates you have medication refills left, it is not necessary to call your provider’s office. Please contact your pharmacy and they will refill your medication.    If your prescription bottle indicates you do not have any refills left, you may request refills at any time through one of the following ways: The online [employer] system (except Urgent Care), by calling your provider’s office, or by asking your pharmacy to contact your provider’s office with a refill request. Medication refills are processed only during regular business hours and may not be available until the next business day. Your provider may request additional information or to have a follow-up visit with you prior to refilling your medication.   *Please Note: Medication refills are assigned a new Rx number when refilled electronically. Your pharmacy may indicate that no refills were authorized even though a new prescription for the same medication is available at the pharmacy. Please request the medicine by name with the pharmacy before contacting your provider for a refill.        Instructions    Labor  precautions       Other Notes About Your Plan     Breast Feeding   Epidural : possible            MyChart Access Code: Activation code not generated  Current MyChart Status: Active

## 2017-08-23 ENCOUNTER — ROUTINE PRENATAL (OUTPATIENT)
Dept: OBGYN | Facility: MEDICAL CENTER | Age: 27
End: 2017-08-23
Payer: COMMERCIAL

## 2017-08-23 VITALS — DIASTOLIC BLOOD PRESSURE: 66 MMHG | BODY MASS INDEX: 25.19 KG/M2 | SYSTOLIC BLOOD PRESSURE: 100 MMHG | WEIGHT: 156 LBS

## 2017-08-23 DIAGNOSIS — Z34.03 ENCOUNTER FOR SUPERVISION OF NORMAL FIRST PREGNANCY IN THIRD TRIMESTER: ICD-10-CM

## 2017-08-23 PROCEDURE — 90040 PR PRENATAL FOLLOW UP: CPT | Performed by: OBSTETRICS & GYNECOLOGY

## 2017-08-23 NOTE — PROGRESS NOTES
Pt denies CTX, LOF, VB or any other c/o.  Good fetal movement.    Lab:  Recent Results (from the past 672 hour(s))   GRP B STREP, BY PCR (ZAPATA BROTH)    Collection Time: 17  4:08 PM   Result Value Ref Range    Strep Gp B DNA PCR Negative Negative       A/P:  27 y.o.  at 39w0d presents for obstetric follow-up.    1.  Continue prenatal vitamins.  2.  Begin/continue kick counts at 28 weeks   3.  Watch weight gain.  4.  Increase water intake.  5.  Follow-up in 1 weeks.  6.  PTL/labor precautions given  7.  Pt desires IOL

## 2017-08-23 NOTE — MR AVS SNAPSHOT
Patricia Novoa   2017 4:00 PM   Routine Prenatal   MRN: [id number]    Department:  [facility]   Dept Phone:  [phone]    Description:  Female : 1990   Provider:  Shikha Lawson M.D.           Allergies as of 2017     No Known Allergies      You were diagnosed with     Encounter for supervision of normal first pregnancy in third trimester   [[id number]]         Vital Signs     Blood Pressure Weight Last Menstrual Period             100/66 mmHg 70.761 kg (156 lb) 2016         Basic Information     Date Of Birth Sex Race Ethnicity Preferred Language    1990 Female White Non- English      Problem List              ICD-10-CM Priority Class Noted - Resolved    Encounter for supervision of normal first pregnancy in third trimester Z34.03   2017 - Present    Normal pregnancy Z34.90   2017 - Present      Health Maintenance        Date Due Completion Dates    IMM HEP B VACCINE (1 of 3 - Primary Series) 1990 ---    IMM HEP A VACCINE (1 of 2 - Standard Series) 2/15/1992 ---    IMM HPV VACCINE (1 of 3 - Female 3 Dose Series) 2/15/2002 ---    IMM VARICELLA (CHICKENPOX) VACCINE (1 of 2 - 2 Dose Adolescent Series) 2/15/2004 ---    IMM DTaP/Tdap/Td Vaccine (1 - Tdap) 2/15/2010 ---    IMM INFLUENZA (1) 2017 10/23/2016    PAP SMEAR 2020, 2017 (Done)    Override on 2017: Done (mari - WNL)            Current Immunizations     Influenza TIV (IM) 10/23/2016      Below and/or attached are the medications your provider expects you to take. Review all of your home medications and newly ordered medications with your provider and/or pharmacist. Follow medication instructions as directed by your provider and/or pharmacist. Please keep your medication list with you and share with your provider. Update the information when medications are discontinued, doses are changed, or new medications (including over-the-counter products) are added; and carry  medication information at all times in the event of emergency situations     Allergies:  No Known Allergies          Medications  Valid as of: August 23, 2017 -  4:19 PM    Generic Name Brand Name Tablet Size Instructions for use    Misc. Devices (Misc) Breast Pump  1 Each by Other route every day. Double Electric Pump, Medically Necessary   Z39.1        .                 Medicines prescribed today were sent to:     [facility] - [place], [place] - [address]    [address] [place] [address]    Phone: [phone] Fax: [phone]    Open 24 Hours?: No      Medication refill instructions:       If your prescription bottle indicates you have medication refills left, it is not necessary to call your provider’s office. Please contact your pharmacy and they will refill your medication.    If your prescription bottle indicates you do not have any refills left, you may request refills at any time through one of the following ways: The online [employer] system (except Urgent Care), by calling your provider’s office, or by asking your pharmacy to contact your provider’s office with a refill request. Medication refills are processed only during regular business hours and may not be available until the next business day. Your provider may request additional information or to have a follow-up visit with you prior to refilling your medication.   *Please Note: Medication refills are assigned a new Rx number when refilled electronically. Your pharmacy may indicate that no refills were authorized even though a new prescription for the same medication is available at the pharmacy. Please request the medicine by name with the pharmacy before contacting your provider for a refill.        Your To Do List     Future Labs/Procedures Complete By Expires    INDUCTION OF LABOR  As directed 8/23/2018      Instructions    Induction sent         Other Notes About Your Plan     Breast Feeding   Epidural : possible            MyChart  Access Code: Activation code not generated  Current MyChart Status: Active

## 2017-08-30 ENCOUNTER — ROUTINE PRENATAL (OUTPATIENT)
Dept: OBGYN | Facility: MEDICAL CENTER | Age: 27
End: 2017-08-30
Payer: COMMERCIAL

## 2017-08-30 VITALS — DIASTOLIC BLOOD PRESSURE: 56 MMHG | SYSTOLIC BLOOD PRESSURE: 88 MMHG | WEIGHT: 156 LBS | BODY MASS INDEX: 25.18 KG/M2

## 2017-08-30 DIAGNOSIS — Z34.03 ENCOUNTER FOR SUPERVISION OF NORMAL FIRST PREGNANCY IN THIRD TRIMESTER: ICD-10-CM

## 2017-08-30 PROCEDURE — 90040 PR PRENATAL FOLLOW UP: CPT | Performed by: OBSTETRICS & GYNECOLOGY

## 2017-08-30 NOTE — PROGRESS NOTES
Pt here today for OB follow up, good fetal movement, denies LOF, VB. Pt has been given IOL instructions.

## 2017-08-30 NOTE — PROGRESS NOTES
Pt denies CTX, LOF, VB or any other c/o.  Good fetal movement.    Lab:No results found for this or any previous visit (from the past 672 hour(s)).    A/P:  27 y.o.  at 40w0d presents for obstetric follow-up.    1.  Continue prenatal vitamins.  2.  Begin/continue kick counts at 28 weeks   3.  Watch weight gain.  4.  Increase water intake.  5.  IOL on 17  6.  PTL/labor precautions given

## 2017-09-01 ENCOUNTER — HOSPITAL ENCOUNTER (OUTPATIENT)
Facility: MEDICAL CENTER | Age: 27
End: 2017-09-01
Attending: OBSTETRICS & GYNECOLOGY | Admitting: OBSTETRICS & GYNECOLOGY
Payer: COMMERCIAL

## 2017-09-01 VITALS — HEIGHT: 66 IN | WEIGHT: 156 LBS | BODY MASS INDEX: 25.07 KG/M2

## 2017-09-01 PROCEDURE — 59025 FETAL NON-STRESS TEST: CPT | Performed by: OBSTETRICS & GYNECOLOGY

## 2017-09-02 ENCOUNTER — HOSPITAL ENCOUNTER (INPATIENT)
Facility: MEDICAL CENTER | Age: 27
LOS: 3 days | End: 2017-09-05
Attending: OBSTETRICS & GYNECOLOGY | Admitting: OBSTETRICS & GYNECOLOGY
Payer: COMMERCIAL

## 2017-09-02 LAB
ERYTHROCYTE [DISTWIDTH] IN BLOOD BY AUTOMATED COUNT: 43 FL (ref 35.9–50)
HCT VFR BLD AUTO: 37.7 % (ref 37–47)
HGB BLD-MCNC: 12.6 G/DL (ref 12–16)
HOLDING TUBE BB 8507: NORMAL
MCH RBC QN AUTO: 29.2 PG (ref 27–33)
MCHC RBC AUTO-ENTMCNC: 33.4 G/DL (ref 33.6–35)
MCV RBC AUTO: 87.3 FL (ref 81.4–97.8)
PLATELET # BLD AUTO: 228 K/UL (ref 164–446)
PMV BLD AUTO: 10.9 FL (ref 9–12.9)
RBC # BLD AUTO: 4.32 M/UL (ref 4.2–5.4)
WBC # BLD AUTO: 11.2 K/UL (ref 4.8–10.8)

## 2017-09-02 PROCEDURE — 85027 COMPLETE CBC AUTOMATED: CPT

## 2017-09-02 PROCEDURE — 700101 HCHG RX REV CODE 250

## 2017-09-02 PROCEDURE — 700105 HCHG RX REV CODE 258: Performed by: OBSTETRICS & GYNECOLOGY

## 2017-09-02 PROCEDURE — 770002 HCHG ROOM/CARE - OB PRIVATE (112)

## 2017-09-02 PROCEDURE — 10H07YZ INSERTION OF OTHER DEVICE INTO PRODUCTS OF CONCEPTION, VIA NATURAL OR ARTIFICIAL OPENING: ICD-10-PCS | Performed by: OBSTETRICS & GYNECOLOGY

## 2017-09-02 PROCEDURE — 700105 HCHG RX REV CODE 258

## 2017-09-02 PROCEDURE — 700111 HCHG RX REV CODE 636 W/ 250 OVERRIDE (IP)

## 2017-09-02 PROCEDURE — 3E033VJ INTRODUCTION OF OTHER HORMONE INTO PERIPHERAL VEIN, PERCUTANEOUS APPROACH: ICD-10-PCS | Performed by: OBSTETRICS & GYNECOLOGY

## 2017-09-02 RX ORDER — SODIUM CHLORIDE, SODIUM LACTATE, POTASSIUM CHLORIDE, CALCIUM CHLORIDE 600; 310; 30; 20 MG/100ML; MG/100ML; MG/100ML; MG/100ML
INJECTION, SOLUTION INTRAVENOUS CONTINUOUS
Status: DISCONTINUED | OUTPATIENT
Start: 2017-09-02 | End: 2017-09-03 | Stop reason: HOSPADM

## 2017-09-02 RX ORDER — SODIUM CHLORIDE, SODIUM LACTATE, POTASSIUM CHLORIDE, CALCIUM CHLORIDE 600; 310; 30; 20 MG/100ML; MG/100ML; MG/100ML; MG/100ML
INJECTION, SOLUTION INTRAVENOUS CONTINUOUS
Status: DISPENSED | OUTPATIENT
Start: 2017-09-02 | End: 2017-09-02

## 2017-09-02 RX ORDER — ALUMINA, MAGNESIA, AND SIMETHICONE 2400; 2400; 240 MG/30ML; MG/30ML; MG/30ML
30 SUSPENSION ORAL EVERY 6 HOURS PRN
Status: DISCONTINUED | OUTPATIENT
Start: 2017-09-02 | End: 2017-09-03 | Stop reason: HOSPADM

## 2017-09-02 RX ORDER — BUPIVACAINE HYDROCHLORIDE 2.5 MG/ML
INJECTION, SOLUTION EPIDURAL; INFILTRATION; INTRACAUDAL
Status: ACTIVE
Start: 2017-09-02 | End: 2017-09-03

## 2017-09-02 RX ORDER — HYDROXYZINE 50 MG/1
50 TABLET, FILM COATED ORAL EVERY 6 HOURS PRN
Status: DISCONTINUED | OUTPATIENT
Start: 2017-09-02 | End: 2017-09-03 | Stop reason: HOSPADM

## 2017-09-02 RX ORDER — OXYTOCIN 10 [USP'U]/ML
10 INJECTION, SOLUTION INTRAMUSCULAR; INTRAVENOUS
Status: DISCONTINUED | OUTPATIENT
Start: 2017-09-02 | End: 2017-09-03 | Stop reason: HOSPADM

## 2017-09-02 RX ORDER — ROPIVACAINE HYDROCHLORIDE 2 MG/ML
INJECTION, SOLUTION EPIDURAL; INFILTRATION; PERINEURAL
Status: COMPLETED
Start: 2017-09-02 | End: 2017-09-02

## 2017-09-02 RX ORDER — SODIUM CHLORIDE, SODIUM LACTATE, POTASSIUM CHLORIDE, CALCIUM CHLORIDE 600; 310; 30; 20 MG/100ML; MG/100ML; MG/100ML; MG/100ML
INJECTION, SOLUTION INTRAVENOUS
Status: COMPLETED
Start: 2017-09-02 | End: 2017-09-02

## 2017-09-02 RX ADMIN — SODIUM CHLORIDE, POTASSIUM CHLORIDE, SODIUM LACTATE AND CALCIUM CHLORIDE: 600; 310; 30; 20 INJECTION, SOLUTION INTRAVENOUS at 20:17

## 2017-09-02 RX ADMIN — Medication 20 UNITS: at 12:40

## 2017-09-02 RX ADMIN — SODIUM CHLORIDE, POTASSIUM CHLORIDE, SODIUM LACTATE AND CALCIUM CHLORIDE: 600; 310; 30; 20 INJECTION, SOLUTION INTRAVENOUS at 23:25

## 2017-09-02 RX ADMIN — SODIUM CHLORIDE, POTASSIUM CHLORIDE, SODIUM LACTATE AND CALCIUM CHLORIDE 1000 ML: 600; 310; 30; 20 INJECTION, SOLUTION INTRAVENOUS at 12:39

## 2017-09-02 RX ADMIN — ROPIVACAINE HYDROCHLORIDE 100 ML: 2 INJECTION, SOLUTION EPIDURAL; INFILTRATION at 22:05

## 2017-09-02 RX ADMIN — SODIUM CHLORIDE, POTASSIUM CHLORIDE, SODIUM LACTATE AND CALCIUM CHLORIDE: 600; 310; 30; 20 INJECTION, SOLUTION INTRAVENOUS at 22:15

## 2017-09-02 ASSESSMENT — PATIENT HEALTH QUESTIONNAIRE - PHQ9
SUM OF ALL RESPONSES TO PHQ9 QUESTIONS 1 AND 2: 0
SUM OF ALL RESPONSES TO PHQ QUESTIONS 1-9: 0
2. FEELING DOWN, DEPRESSED, IRRITABLE, OR HOPELESS: NOT AT ALL
1. LITTLE INTEREST OR PLEASURE IN DOING THINGS: NOT AT ALL

## 2017-09-02 ASSESSMENT — LIFESTYLE VARIABLES
DO YOU DRINK ALCOHOL: NO
EVER_SMOKED: NEVER
DO YOU DRINK ALCOHOL: NO

## 2017-09-02 ASSESSMENT — PAIN SCALES - GENERAL: PAINLEVEL_OUTOF10: 4

## 2017-09-02 NOTE — PROGRESS NOTES
"   EDC 2017   EGA 40w2d  2215: Pt hooked up to TOCO/US. Pt presents this evening for outpatient prostaglandin gel placement. Pt states, \"states +FM, declines UC's but states having minor pressure, declines LOF, and declines VB.   2315: Dr. Cottrell called with labor status update. Pt alisa every 2-4 minutes, but states she isn't feeling any contractions just minor pressure. Orders given to discontinue prostaglandin gel, and discharge patient home.   2318: Discharge instructions gone over with patient at this time, all questions and concerns addressed.   "

## 2017-09-02 NOTE — PROGRESS NOTES
1000 Assumed pt care. Pt presents for IOL for post-dates. Pt is 40.3 with EDC of 17. . No problems wit pregnancy noted. FOB at bedside.     1020 PIV started in R forearm. Saline locked. Admit questions asked. Pt would like epidural for pain mgmt. SVE 1-2/50/-3 per RN. Reactive tracing; alisa every 2-6 minutes.    1230 Dr. Valerio at bedside. SVE 1-2/50/-2 Per MD. Orders for pitocin received; start at 1mu/hr, increase by 2mu every 30 minutes as needed.    1315 Pitocin increased to 3mu/hr per doctor orders.    1715 Dr. Valerio at bedside. SVE 1-2/70/-2. Pitocin increased to 5mu/hr per doctor orders. Familyand FOB at bedside.

## 2017-09-02 NOTE — CARE PLAN
" <p align=\"LEFT\"><IMG SRC=\"//[email or web address]\" alt=\"Image\" WIDTH=\"50%\" HEIGHT=\"200\" BORDER=\"\"></p>                   Name:Raf Pelayo  Medical Record Number:[id number]  CSN: [id number]    YOB: 1939   Age: 77 y.o.  Sex: male  HT:1.778 m (5' 10\") WT: 88.8 kg (195 lb 12.3 oz)          Admit Date: 7/6/2017     Discharge Date:   Today's Date: 7/10/2017  Attending Doctor:  Atnhony Gaffney M.D.                  Allergies:  Review of patient's allergies indicates no known allergies.          Your appointments     Jul 26, 2017  4:00 PM   HOSPITAL FOLLOW UP with LORENA Armando   [facility] (--)    [address]  [place] [address]   [phone]            Aug 16, 2017  8:20 AM   Established Patient with Brandon Velázquez M.D.   [facility]    [address] [facility] [address]   [phone]           You will be receiving a confirmation call a few days before your appointment from our automated call confirmation system.            Sep 13, 2017  7:40 AM   Established Patient with Brandon Velázquez M.D.   [facility]    [address] [address] [facility] [address]   [phone]           You will be receiving a confirmation call a few days before your appointment from our automated call confirmation system.              Follow-up Information     1. Follow up with Anthony Gaffney M.D. On 8/22/2017.    Specialty:  Cardiac Surgery    Why:  2:00 pm    Contact information    [address]  [address]  [facility] [address]  [phone]           Medication List      Take these Medications        Instructions    amiodarone 400 MG tablet   Commonly known as:  PACERONE    Take 1 Tab by mouth every day.   Dose:  400 mg       aspirin 81 MG Chew chewable tablet   Commonly known as:  ASA    Take 81 mg by mouth every day.   Dose:  81 mg       atorvastatin 40 MG Tabs   What changed:    - medication " Problem: Pain  Goal: Alleviation of Pain or a reduction in pain to the patient's comfort goal    Intervention: Pain Management - Epidural/Spinal  Patient would like an epidural when she gets in to active labor; educated pt on epidural process      Problem: Risk for Infection, Impaired Wound Healing  Goal: Remain free from signs and symptoms of infection  Will monitor pt's VS and administer abx as needed       strength  - how much to take   Commonly known as:  LIPITOR    Take 1 Tab by mouth every bedtime.   Dose:  40 mg       clopidogrel 75 MG Tabs   Commonly known as:  PLAVIX    Take 1 Tab by mouth every day.   Dose:  75 mg       DAYQUIL MULTI-SYMPTOM COLD/FLU PO    Take 1 Cap by mouth every 6 hours as needed.   Dose:  1 Cap        MG Caps    Take 100 mg by mouth every morning.   Dose:  100 mg       furosemide 40 MG Tabs   Commonly known as:  LASIX    Take 1 Tab by mouth 2 Times a Day.   Dose:  40 mg       GLUCOSAMINE CHONDR 1500 COMPLX Caps    Take 1 Cap by mouth every day.   Dose:  1 Cap       hydrocodone-acetaminophen 5-325 MG Tabs per tablet   Commonly known as:  NORCO    Take 1-2 Tabs by mouth every 6 hours as needed.   Dose:  1-2 Tab       metformin 500 MG Tabs   Commonly known as:  GLUCOPHAGE    Take 1 Tab by mouth 2 times a day, with meals.   Dose:  500 mg       metoprolol 25 MG Tabs   Commonly known as:  LOPRESSOR    Take 1 Tab by mouth 2 Times a Day.   Dose:  25 mg       NON SPECIFIED   What changed:  additional instructions    Apply blue emu oil to painful shoulders       potassium chloride SA 20 MEQ Tbcr   Commonly known as:  Kdur    Take 1 Tab by mouth 2 Times a Day.   Dose:  20 mEq       therapeutic multivitamin-minerals Tabs    Take 1 Tab by mouth every day.   Dose:  1 Tab       vitamin D3 (cholecalciferol) 1000 UNIT Tabs    Take 1 Tab by mouth every day.   Dose:  1000 Units

## 2017-09-02 NOTE — H&P
"History and Physical      Patricia Novoa is a 27 y.o. year old female  at 40w3d who presents for IOL due to post dates.      Subjective:   positive  For CTXS.   negative Feels pain   negative for LOF  negative for vaginal bleeding.   positive for fetal movement    ROS: Pertinent items are noted in HPI.    No past medical history on file.  No past surgical history on file.  OB History    Para Term  AB Living   1             SAB TAB Ectopic Molar Multiple Live Births                    # Outcome Date GA Lbr [place]/2nd Weight Sex Delivery Anes PTL Lv   1 Current                 Social History     Social History   • Marital status:      Spouse name: N/A   • Number of children: N/A   • Years of education: N/A     Occupational History   • Not on file.     Social History Main Topics   • Smoking status: Never Smoker   • Smokeless tobacco: Never Used   • Alcohol use Not on file   • Drug use: Unknown   • Sexual activity: Not on file     Other Topics Concern   • Not on file     Social History Narrative   • No narrative on file     Allergies: Review of patient's allergies indicates no known allergies.    Current Facility-Administered Medications:   •  oxytocin (PITOCIN) 20 UNITS/1000ML LR, , , ,   •  LACTATED RINGERS IV SOLN, , , ,     Prenatal care with [facility] starting at 19 wks with following problems:  Patient Active Problem List    Diagnosis Date Noted   • Normal pregnancy 2017   • Encounter for supervision of normal first pregnancy in third trimester 2017               Objective:      Temperature 36.8 °C (98.2 °F), height 1.676 m (5' 6\"), weight 70.8 kg (156 lb), last menstrual period 2016.    General:   no acute distress   Skin:   normal   HEENT:  PERRLA   Lungs:   CTA bilateral   Heart:   S1, S2 normal, no murmur, click, rub or gallop, regular rate and rhythm, brisk carotid upstroke without bruits, peripheral pulses very brisk, chest is clear without rales or wheezing, no pedal " edema, no JVD, no hepatosplenomegaly   Abdomen:   gravid, NT   EFW:  3500 grams   Pelvis:  adequate with gynecoid pelvis   FHT:  150s BPM, moderate variability, + accels   Uterine Size: S=D   Presentations: Cephalic   Cervix:     Dilation: 1-2 cm    Effacement: 50%    Station:  -2    Consistency: Medium    Position: Posterior     Lab Review  Lab:   Blood type: A     Recent Results (from the past 5880 hour(s))   POCT 11 Panel Urine Drug Screen    Collection Time: 01/23/17  9:50 AM   Result Value Ref Range    AMPHETAMINE      COCAINE      POC THC      METHAMPHETAMINES      OPIATES      PHENCYCLIDINE      BENZODIAZIPINES      BARBITURATES      METHADONE      MDMA Ecstasy      OXYCODONE      Urine Drug Screen NEG     Internal Control Positive Valid     Internal Control Negative Valid    VARICELLA ZOSTER IGG AB    Collection Time: 01/23/17 10:28 AM   Result Value Ref Range    Varicella Zoster IgG Ab 219 IV   RUBEOLA AB IGM    Collection Time: 01/23/17 10:28 AM   Result Value Ref Range    Rubeola Igg Antibody 76.7 AU/mL   RUBELLA ABS IGG    Collection Time: 01/23/17 10:28 AM   Result Value Ref Range    Rubella IgG Antibody 15.00 IU/mL   QuantiFERON-TB Gold [TB TEST CELL IMM MEASURE AG]    Collection Time: 01/23/17 10:28 AM   Result Value Ref Range    Nil 0.044     TB Ag-Nil -0.001     Mitogen-Nil 68.604     Quantiferon TB Gold Negative Negative   MUMPS AB IGG    Collection Time: 01/23/17 10:28 AM   Result Value Ref Range    Mumps Ab Igg 44.6 AU/mL   HEP B SURFACE ANTIGEN    Collection Time: 01/23/17 10:28 AM   Result Value Ref Range    Hepatitis B Surface Antigen Negative Negative   HEP B SURFACE AB    Collection Time: 01/23/17 10:28 AM   Result Value Ref Range    Hep B Surface Antibody Quant 250.19 (H) 0.00 - 10.00 mIU/mL   HEP B CORE AB TOTAL    Collection Time: 01/23/17 10:28 AM   Result Value Ref Range    Hepatitis B Ccore Ab, Total Negative Negative   HCT    Collection Time: 02/08/17  1:33 AM   Result Value Ref Range     Hematocrit 41.9    HGB    Collection Time: 02/08/17  1:33 AM   Result Value Ref Range    Hemoglobin 14.1    HIV ANTIBODIES    Collection Time: 02/08/17  5:52 AM   Result Value Ref Range    HIV 1,0,2 IC NON REACTIVE    RUBELLA ABS IGG    Collection Time: 02/08/17  5:54 AM   Result Value Ref Range    Rubella IgG Antibody IMMUNE    RPR QUAL W/REFLEX    Collection Time: 02/08/17  5:54 AM   Result Value Ref Range    Rapid Plasma Reagin -Rpr- NON REACTIVE    HEP B SURFACE ANTIGEN    Collection Time: 02/08/17  5:54 AM   Result Value Ref Range    Hepatitis B Surface Antigen NEGATIVE    ANTIBODY SCREEN    Collection Time: 02/08/17 12:05 PM   Result Value Ref Range    Antibody Screen Scrn NEGATIVE    ABO AND RH DETERMINATION    Collection Time: 02/08/17  2:09 PM   Result Value Ref Range    Rh Grouping Only POSITIVE     ABO Grouping Only A    GC DNA PROBE    Collection Time: 02/09/17  4:58 PM   Result Value Ref Range    Gc By Dna Probe NEGATIVE    CHLAMYDIA DNA PROBE    Collection Time: 02/09/17  4:58 PM   Result Value Ref Range    Chlamydia By Dna Probe NEGATIVE    PAP IG, RFX HPV ASCU    Collection Time: 02/17/17 12:44 PM   Result Value Ref Range    Physician Read Pap NEGATIVE    GLUCOSE 1HR GESTATIONAL    Collection Time: 05/12/17 11:54 AM   Result Value Ref Range    Glucose, Post Dose 130 70 - 139 mg/dL   CBC WITHOUT DIFFERENTIAL    Collection Time: 05/12/17 11:54 AM   Result Value Ref Range    WBC 7.8 4.8 - 10.8 K/uL    RBC 3.90 (L) 4.20 - 5.40 M/uL    Hemoglobin 12.1 12.0 - 16.0 g/dL    Hematocrit 35.8 (L) 37.0 - 47.0 %    MCV 91.8 81.4 - 97.8 fL    MCH 31.0 27.0 - 33.0 pg    MCHC 33.8 33.6 - 35.0 g/dL    RDW 45.5 35.9 - 50.0 fL    Platelet Count 232 164 - 446 K/uL    MPV 9.9 9.0 - 12.9 fL   T.PALLIDUM AB EIA    Collection Time: 05/12/17 11:54 AM   Result Value Ref Range    Syphilis, Treponemal Qual Non Reactive Non Reactive   GRP B STREP, BY PCR (ZAPATA BROTH)    Collection Time: 07/28/17  4:08 PM   Result Value Ref  Range    Strep Gp B DNA PCR Negative Negative   CBC WITHOUT DIFFERENTIAL    Collection Time: 17 11:00 AM   Result Value Ref Range    WBC 11.2 (H) 4.8 - 10.8 K/uL    RBC 4.32 4.20 - 5.40 M/uL    Hemoglobin 12.6 12.0 - 16.0 g/dL    Hematocrit 37.7 37.0 - 47.0 %    MCV 87.3 81.4 - 97.8 fL    MCH 29.2 27.0 - 33.0 pg    MCHC 33.4 (L) 33.6 - 35.0 g/dL    RDW 43.0 35.9 - 50.0 fL    Platelet Count 228 164 - 446 K/uL    MPV 10.9 9.0 - 12.9 fL        Assessment:   Patricia Novoa at 40w3d  Labor status: Not in labor.  Here for elective IOL.  Cervix unfavorable ,but alisa frequently.  Obstetrical history significant for   Patient Active Problem List    Diagnosis Date Noted   • Normal pregnancy 2017   • Encounter for supervision of normal first pregnancy in third trimester 2017   .      Plan:     Admit to L&D  GBS negative  Unable to ripen with cytotec for risk of hyperstimulation.  Will proceed with pitocin.   Anticipate .

## 2017-09-03 PROCEDURE — 700105 HCHG RX REV CODE 258

## 2017-09-03 PROCEDURE — 700111 HCHG RX REV CODE 636 W/ 250 OVERRIDE (IP): Performed by: OBSTETRICS & GYNECOLOGY

## 2017-09-03 PROCEDURE — 700102 HCHG RX REV CODE 250 W/ 637 OVERRIDE(OP): Performed by: OBSTETRICS & GYNECOLOGY

## 2017-09-03 PROCEDURE — 59409 OBSTETRICAL CARE: CPT

## 2017-09-03 PROCEDURE — 3E0E77Z INTRODUCTION OF ELECTROLYTIC AND WATER BALANCE SUBSTANCE INTO PRODUCTS OF CONCEPTION, VIA NATURAL OR ARTIFICIAL OPENING: ICD-10-PCS | Performed by: OBSTETRICS & GYNECOLOGY

## 2017-09-03 PROCEDURE — A9270 NON-COVERED ITEM OR SERVICE: HCPCS | Performed by: OBSTETRICS & GYNECOLOGY

## 2017-09-03 PROCEDURE — 700101 HCHG RX REV CODE 250

## 2017-09-03 PROCEDURE — 700111 HCHG RX REV CODE 636 W/ 250 OVERRIDE (IP)

## 2017-09-03 PROCEDURE — 770002 HCHG ROOM/CARE - OB PRIVATE (112)

## 2017-09-03 PROCEDURE — 36415 COLL VENOUS BLD VENIPUNCTURE: CPT

## 2017-09-03 PROCEDURE — 700101 HCHG RX REV CODE 250: Performed by: ANESTHESIOLOGY

## 2017-09-03 PROCEDURE — 700105 HCHG RX REV CODE 258: Performed by: OBSTETRICS & GYNECOLOGY

## 2017-09-03 PROCEDURE — 10907ZC DRAINAGE OF AMNIOTIC FLUID, THERAPEUTIC FROM PRODUCTS OF CONCEPTION, VIA NATURAL OR ARTIFICIAL OPENING: ICD-10-PCS | Performed by: OBSTETRICS & GYNECOLOGY

## 2017-09-03 PROCEDURE — 303615 HCHG EPIDURAL/SPINAL ANESTHESIA FOR LABOR

## 2017-09-03 PROCEDURE — 304965 HCHG RECOVERY SERVICES

## 2017-09-03 RX ORDER — MISOPROSTOL 200 UG/1
TABLET ORAL
Status: COMPLETED
Start: 2017-09-03 | End: 2017-09-03

## 2017-09-03 RX ORDER — ONDANSETRON 2 MG/ML
4 INJECTION INTRAMUSCULAR; INTRAVENOUS EVERY 6 HOURS PRN
Status: DISCONTINUED | OUTPATIENT
Start: 2017-09-03 | End: 2017-09-05 | Stop reason: HOSPADM

## 2017-09-03 RX ORDER — LIDOCAINE HYDROCHLORIDE AND EPINEPHRINE 15; 5 MG/ML; UG/ML
INJECTION, SOLUTION EPIDURAL
Status: COMPLETED
Start: 2017-09-03 | End: 2017-09-03

## 2017-09-03 RX ORDER — METHYLERGONOVINE MALEATE 0.2 MG/ML
INJECTION INTRAVENOUS
Status: COMPLETED
Start: 2017-09-03 | End: 2017-09-03

## 2017-09-03 RX ORDER — OXYCODONE AND ACETAMINOPHEN 10; 325 MG/1; MG/1
1 TABLET ORAL EVERY 4 HOURS PRN
Status: DISCONTINUED | OUTPATIENT
Start: 2017-09-03 | End: 2017-09-05 | Stop reason: HOSPADM

## 2017-09-03 RX ORDER — METHYLERGONOVINE MALEATE 0.2 MG/ML
0.2 INJECTION INTRAVENOUS ONCE
Status: COMPLETED | OUTPATIENT
Start: 2017-09-03 | End: 2017-09-03

## 2017-09-03 RX ORDER — MISOPROSTOL 200 UG/1
600 TABLET ORAL
Status: DISCONTINUED | OUTPATIENT
Start: 2017-09-03 | End: 2017-09-05 | Stop reason: HOSPADM

## 2017-09-03 RX ORDER — SODIUM CHLORIDE, SODIUM LACTATE, POTASSIUM CHLORIDE, CALCIUM CHLORIDE 600; 310; 30; 20 MG/100ML; MG/100ML; MG/100ML; MG/100ML
INJECTION, SOLUTION INTRAVENOUS PRN
Status: DISCONTINUED | OUTPATIENT
Start: 2017-09-03 | End: 2017-09-05 | Stop reason: HOSPADM

## 2017-09-03 RX ORDER — OXYCODONE HYDROCHLORIDE 5 MG/1
5 TABLET ORAL EVERY 4 HOURS PRN
Status: DISCONTINUED | OUTPATIENT
Start: 2017-09-03 | End: 2017-09-05 | Stop reason: HOSPADM

## 2017-09-03 RX ORDER — OXYCODONE HYDROCHLORIDE AND ACETAMINOPHEN 5; 325 MG/1; MG/1
1 TABLET ORAL EVERY 4 HOURS PRN
Status: DISCONTINUED | OUTPATIENT
Start: 2017-09-03 | End: 2017-09-05 | Stop reason: HOSPADM

## 2017-09-03 RX ORDER — ROPIVACAINE HYDROCHLORIDE 2 MG/ML
INJECTION, SOLUTION EPIDURAL; INFILTRATION; PERINEURAL
Status: COMPLETED
Start: 2017-09-03 | End: 2017-09-03

## 2017-09-03 RX ORDER — OXYCODONE HYDROCHLORIDE 10 MG/1
10 TABLET ORAL EVERY 4 HOURS PRN
Status: DISCONTINUED | OUTPATIENT
Start: 2017-09-03 | End: 2017-09-05 | Stop reason: HOSPADM

## 2017-09-03 RX ORDER — SODIUM CHLORIDE 9 MG/ML
INJECTION, SOLUTION INTRAVENOUS
Status: COMPLETED
Start: 2017-09-03 | End: 2017-09-03

## 2017-09-03 RX ORDER — IBUPROFEN 800 MG/1
800 TABLET ORAL EVERY 8 HOURS PRN
Status: DISCONTINUED | OUTPATIENT
Start: 2017-09-03 | End: 2017-09-05 | Stop reason: HOSPADM

## 2017-09-03 RX ORDER — DOCUSATE SODIUM 100 MG/1
100 CAPSULE, LIQUID FILLED ORAL 2 TIMES DAILY PRN
Status: DISCONTINUED | OUTPATIENT
Start: 2017-09-03 | End: 2017-09-05 | Stop reason: HOSPADM

## 2017-09-03 RX ORDER — IBUPROFEN 600 MG/1
600 TABLET ORAL EVERY 6 HOURS PRN
Status: DISCONTINUED | OUTPATIENT
Start: 2017-09-03 | End: 2017-09-05 | Stop reason: HOSPADM

## 2017-09-03 RX ORDER — ONDANSETRON 4 MG/1
4 TABLET, ORALLY DISINTEGRATING ORAL EVERY 6 HOURS PRN
Status: DISCONTINUED | OUTPATIENT
Start: 2017-09-03 | End: 2017-09-05 | Stop reason: HOSPADM

## 2017-09-03 RX ORDER — ONDANSETRON 4 MG/1
TABLET, ORALLY DISINTEGRATING ORAL
Status: COMPLETED
Start: 2017-09-03 | End: 2017-09-03

## 2017-09-03 RX ORDER — DEXTROSE, SODIUM CHLORIDE, SODIUM LACTATE, POTASSIUM CHLORIDE, AND CALCIUM CHLORIDE 5; .6; .31; .03; .02 G/100ML; G/100ML; G/100ML; G/100ML; G/100ML
INJECTION, SOLUTION INTRAVENOUS CONTINUOUS
Status: DISCONTINUED | OUTPATIENT
Start: 2017-09-03 | End: 2017-09-05 | Stop reason: HOSPADM

## 2017-09-03 RX ORDER — ACETAMINOPHEN 325 MG/1
325 TABLET ORAL EVERY 4 HOURS PRN
Status: DISCONTINUED | OUTPATIENT
Start: 2017-09-03 | End: 2017-09-05 | Stop reason: HOSPADM

## 2017-09-03 RX ORDER — ACETAMINOPHEN 325 MG/1
650 TABLET ORAL EVERY 4 HOURS PRN
Status: DISCONTINUED | OUTPATIENT
Start: 2017-09-03 | End: 2017-09-05 | Stop reason: HOSPADM

## 2017-09-03 RX ORDER — MISOPROSTOL 200 UG/1
800 TABLET ORAL
Status: COMPLETED | OUTPATIENT
Start: 2017-09-03 | End: 2017-09-03

## 2017-09-03 RX ADMIN — ROPIVACAINE HYDROCHLORIDE 100 ML: 2 INJECTION, SOLUTION EPIDURAL; INFILTRATION at 13:02

## 2017-09-03 RX ADMIN — OXYCODONE HYDROCHLORIDE 10 MG: 10 TABLET ORAL at 18:36

## 2017-09-03 RX ADMIN — EPHEDRINE SULFATE 5 MG: 50 INJECTION INTRAVENOUS at 03:12

## 2017-09-03 RX ADMIN — IBUPROFEN 800 MG: 800 TABLET, FILM COATED ORAL at 18:36

## 2017-09-03 RX ADMIN — ACETAMINOPHEN 650 MG: 325 TABLET, FILM COATED ORAL at 11:29

## 2017-09-03 RX ADMIN — ONDANSETRON 4 MG: 2 INJECTION INTRAMUSCULAR; INTRAVENOUS at 00:37

## 2017-09-03 RX ADMIN — Medication 10 MILLI-UNITS/MIN: at 09:40

## 2017-09-03 RX ADMIN — Medication 125 ML/HR: at 18:38

## 2017-09-03 RX ADMIN — SODIUM CHLORIDE, POTASSIUM CHLORIDE, SODIUM LACTATE AND CALCIUM CHLORIDE: 600; 310; 30; 20 INJECTION, SOLUTION INTRAVENOUS at 01:45

## 2017-09-03 RX ADMIN — SODIUM CHLORIDE 1000 ML: 9 INJECTION, SOLUTION INTRAVENOUS at 08:53

## 2017-09-03 RX ADMIN — Medication 19 MILLI-UNITS/MIN: at 01:49

## 2017-09-03 RX ADMIN — EPHEDRINE SULFATE 5 MG: 50 INJECTION INTRAVENOUS at 02:33

## 2017-09-03 RX ADMIN — SODIUM CHLORIDE, POTASSIUM CHLORIDE, SODIUM LACTATE AND CALCIUM CHLORIDE: 600; 310; 30; 20 INJECTION, SOLUTION INTRAVENOUS at 05:58

## 2017-09-03 RX ADMIN — ROPIVACAINE HYDROCHLORIDE 100 ML: 2 INJECTION, SOLUTION EPIDURAL; INFILTRATION at 07:26

## 2017-09-03 RX ADMIN — LIDOCAINE HYDROCHLORIDE,EPINEPHRINE BITARTRATE: 15; .005 INJECTION, SOLUTION EPIDURAL; INFILTRATION; INTRACAUDAL; PERINEURAL at 17:15

## 2017-09-03 RX ADMIN — SODIUM CHLORIDE, SODIUM LACTATE, POTASSIUM CHLORIDE, CALCIUM CHLORIDE AND DEXTROSE MONOHYDRATE: 5; 600; 310; 30; 20 INJECTION, SOLUTION INTRAVENOUS at 10:00

## 2017-09-03 ASSESSMENT — PAIN SCALES - GENERAL
PAINLEVEL_OUTOF10: 9
PAINLEVEL_OUTOF10: 7
PAINLEVEL_OUTOF10: 0

## 2017-09-03 NOTE — PROGRESS NOTES
"S: Pt feeling contractions    O:  Vitals:    17 1000 17 1031   Temp:  36.8 °C (98.2 °F)   Weight: 70.8 kg (156 lb)    Height: 1.676 m (5' 6\")      [place] - q 3-4 min  EFM - 130s, moderate variability, + accels  Cx - 1-2/75%/-2    Pit at 3    A/P: 26 yo  @ 40+3/7 wks presented for IOL.  Fetal status reassuring.  Continue pitocin.  Anticipate .  "

## 2017-09-03 NOTE — PROGRESS NOTES
0700 Report rcvd from Dinorah RN, at bedside. POC discussed, pt care assumed. Pt found to be lying in bed, noticibly uncomfortable with UCs in spite of having an epidural. Pt encouraged to push bolus button and rolled to affected side (Right).   0740 Dr. Valerio at bedside. SVE 4/90-2.   0830 Dr. Ordaz at bedside. SVE 5/80/-2 EFW 3700-3900g.Per Dr. Ordaz's exam, Pts pelvis is adequate. Will turn off pitocin for 1 hour, give juice, start amnioinfusion and reposition with peanut ball. Pitocin turned off. Juice given. Amnioinfusion started.   0940   Pitocin restarted with new bag. Pt tolerated well.    1000 D5LR started via pump.  1104 Pt c/o pain in upper abdomen with UCs. Dr. Bateman notified. In room for bolus. 10 ml of Ropivicaine removed from bag in room for bolus by Dr. Bateman. Order rcvd to increase ropivicaine to 12ml/hr if pt not completely covered by bolus.    1129 Pt c/o headache. Dr. Ordaz notified. Order rcvd for tylenol, given. Epidural basal rate increased to 12ml/hr.   1200 Dr. Ordaz at bedside for update. Will return in an hour or so to recheck. Pt rolled to left side with peanut ball in place.   1230 Pt rolled to right side with peanut ball in place.   1300 Pt still uncomfortable with UCs now mainly in her lower back. Unable to go into hands and knees position secondary to epiduralized legs. . Pt encouraged to push bolus button and breathe until Dr. Bateman is out of surgery.   1328 Late deceleration. FHTs down to 80s with moderate variability and return to 135 over 90 secs.  1334 Irais and Padma, RNs, at bedside. Pt rolled to hands and knees. Pt tolerated well and FHTs reacted favorably.    1345 Dr. Bateman called to evaluate for another bolus.   1348 Dr. Ordaz at bedside. Dr. Bateman at bedside. Will rebolus pt after exam.  1350 SVE Complete/+1! Dr. Ordaz would like pt to labor down and gather energy. He would prefer that Dr. Bateman not bolus her so she can feel to push. Pt okay with this plan. Dr. Bateman  informed. Pt repositioned into throne position to facilitate passive descent. Room prepared for delivery.   1450 Pushing commenced.   1545 Tug of war pushing with good results.   1632 Dr. Ordaz at bedside. Pushing with patient until delivery.   1713 and 40 seconds. Kiwi vacuum placed x 55 seconds.   1714 Head delivered. Mouth and nose suctioned on the perineum with delee by Dr Ordaz.   1715 Delivery. 8/9 apgars, male. Baby taken to bed for respiratory therapist and Rns to assess. 2nd degree midline episiotomy repaired with local and 2 2-0 Chromic on CT-1 and 2 3-0 Chromic on Ct-1. Pt tolerated well.   1845 Meal ordered.   1900 Report to YAMILET Copeland, at bedside.

## 2017-09-03 NOTE — PROGRESS NOTES
1900-report from NAHUN Bennett RN. Pt states her uc's are tolerable at this time but would eventually like an epidural. No needs at this time. Pt educated on frequent position changes - pt requesting birthing ball. JEN Gaspar at bedside.   0015-pt comfortable with epidural. Dr Valerio in room to assess patient.   0230-Pt experiencing intermittent hypotension since the epidural was placed but has not been symptomatic. Baby has had occasional late decelerations. Dr Blake phoned. Ephedrine 5 mg PRN ordered (can be given every 10 minutes).   0415-Dr Valerio phoned - MD updated on occasional maternal hypotension and late decelerations. Also updated on unchanged vaginal exam. Order received to administer up to 30 mU/min of Pitocin.   0620-pt feeling pressure with contractions. SVE=3-4/90/-2.   0700-report to MARCELO Enriquez RN.

## 2017-09-03 NOTE — CARE PLAN
Problem: Pain  Goal: Alleviation of Pain or a reduction in pain to the patient's comfort goal  Outcome: PROGRESSING AS EXPECTED  Pt would like epidural during labor. Pt and FOB educated on other pain mgmt options as well. Pain assessment will continue through labor.     Problem: Risk for Infection, Impaired Wound Healing  Goal: Remain free from signs and symptoms of infection  Outcome: PROGRESSING AS EXPECTED  Pt remains afebrile and w/o signs/symptoms of infection.

## 2017-09-03 NOTE — PROGRESS NOTES
S: Pt feeling pain despite epidural.    O:  Vitals:    17 0600 17 0602 17 0619 17 0640   BP: (!) 94/60 (!) 96/62 103/60 104/62   Pulse: 81 67 76 81   Temp:       TempSrc:       Weight:       Height:         IUPC - q 2-3 min  EFM - 130s, moderate variablity, + accels  Cx - 4 cm/90%/-2    Pit at 25    A/P: 26 yo  @ 40+5/7 wks admitted for IOL due to postdates.  Fetal status reassuring.  Continue pitocin. Anticipate .

## 2017-09-03 NOTE — PROGRESS NOTES
S: Pt comfortable with epidural.    O:  Vitals:    17 2314 17 2319 17 2323 17 2349   BP: (!) 98/62 105/61 108/59 (!) 93/54   Pulse: 77 77 86 75   Temp:       Weight:       Height:         [place] - q 2-3 min  EFM - 130s, moderate variability, + accels  Cx - 2 cm/75%/-2    Pit at 15    A/P: 26 yo  @ 40+4/7 wks admitted for IOL due to postdates.  Fetal status reassuring.  Continue pitocin.  Anticipate .

## 2017-09-03 NOTE — PROGRESS NOTES
L&D Progress Note    Name:   Patricia Novoa   Date/Time:  9/3/2017 9:38 AM  Gestational Age:  40w4d  Admit Date:   9/2/2017  Admitting Dx:   Pregnancy  Indication for care in labor or delivery   On Call Attending Acceptance Note     Subjective:  Uterine contractions: yes  Pain:    yes  Complaints:   no   Headache: .  no  Blurred vision:  no   SOB:    no   Nausea/vomiting:  no  Epigastric pain:  no  Fetal movement:  normal  Vaginal bleeding: . no    Objective:   Vitals:    09/03/17 0820 09/03/17 0830 09/03/17 0839 09/03/17 0859   BP: 102/53  (!) 99/64 (!) 98/60   Pulse: 100  67 72   Temp:  36.7 °C (98 °F)     TempSrc:  Temporal     Weight:       Height:         Fetal heart variability: moderate  Fetal Heart Rate decelerations: variable and early  Uterine contractions: regular, every 3-4 minutes  Cervical: 75% ;  Dilatation .5 ; Station negative2    Fetal position:   Cephalic  Membranes ruptured: yes  AROM:  Yes  0019  Meconium: .  no    IUPC: .   yes  FSE .   no  EFW 3900 gm   Meds:   Epidural : .  yes  Magnesium sulfate: . no  Pitocin: .  yes    Labs:  Recent Results (from the past 72 hour(s))   CBC WITHOUT DIFFERENTIAL    Collection Time: 09/02/17 11:00 AM   Result Value Ref Range    WBC 11.2 (H) 4.8 - 10.8 K/uL    RBC 4.32 4.20 - 5.40 M/uL    Hemoglobin 12.6 12.0 - 16.0 g/dL    Hematocrit 37.7 37.0 - 47.0 %    MCV 87.3 81.4 - 97.8 fL    MCH 29.2 27.0 - 33.0 pg    MCHC 33.4 (L) 33.6 - 35.0 g/dL    RDW 43.0 35.9 - 50.0 fL    Platelet Count 228 164 - 446 K/uL    MPV 10.9 9.0 - 12.9 fL   Hold Blood Bank Specimen (Not Tested)    Collection Time: 09/02/17 11:00 AM   Result Value Ref Range    Holding Tube - Bb DONE          Assessment:   Gestational Age:   40w4d  Risk Factors:   LGA  Labor State:    Prolonged active phase labor.  Pregnancy Complications: None   Plan:    Reassess after 2 hours, Patient with pitocin started 9/2, at 11 am , with unfavorable cerivx , and now after 22 hrs , only at 5 cm , with delayed  progress, attributable to lack of adequate analgesia and UC. .   Strong consideration fro CPD, with EFW by my exam at 3900gm   Will D/C Pitocin , for short time , start amnioinfusion for variables and re start pitocin ( new Basg ) , at lower level . Then if not adequate progress will dx with 2nd Arrest of Dilatation     Patient Active Problem List    Diagnosis Date Noted   • Indication for care in labor or delivery 09/02/2017   • Normal pregnancy 07/14/2017   • Encounter for supervision of normal first pregnancy in third trimester 06/02/2017       Hayder Ordaz M.D.

## 2017-09-04 LAB
ERYTHROCYTE [DISTWIDTH] IN BLOOD BY AUTOMATED COUNT: 44.7 FL (ref 35.9–50)
HCT VFR BLD AUTO: 30.5 % (ref 37–47)
HGB BLD-MCNC: 10.2 G/DL (ref 12–16)
MCH RBC QN AUTO: 29.1 PG (ref 27–33)
MCHC RBC AUTO-ENTMCNC: 33 G/DL (ref 33.6–35)
MCV RBC AUTO: 88.1 FL (ref 81.4–97.8)
PLATELET # BLD AUTO: 191 K/UL (ref 164–446)
PMV BLD AUTO: 11.1 FL (ref 9–12.9)
RBC # BLD AUTO: 3.44 M/UL (ref 4.2–5.4)
WBC # BLD AUTO: 23.2 K/UL (ref 4.8–10.8)

## 2017-09-04 PROCEDURE — A9270 NON-COVERED ITEM OR SERVICE: HCPCS | Performed by: OBSTETRICS & GYNECOLOGY

## 2017-09-04 PROCEDURE — 90471 IMMUNIZATION ADMIN: CPT

## 2017-09-04 PROCEDURE — 700112 HCHG RX REV CODE 229: Performed by: OBSTETRICS & GYNECOLOGY

## 2017-09-04 PROCEDURE — 700102 HCHG RX REV CODE 250 W/ 637 OVERRIDE(OP): Performed by: OBSTETRICS & GYNECOLOGY

## 2017-09-04 PROCEDURE — 700111 HCHG RX REV CODE 636 W/ 250 OVERRIDE (IP): Performed by: OBSTETRICS & GYNECOLOGY

## 2017-09-04 PROCEDURE — 90686 IIV4 VACC NO PRSV 0.5 ML IM: CPT | Performed by: OBSTETRICS & GYNECOLOGY

## 2017-09-04 PROCEDURE — 36415 COLL VENOUS BLD VENIPUNCTURE: CPT

## 2017-09-04 PROCEDURE — 3E0234Z INTRODUCTION OF SERUM, TOXOID AND VACCINE INTO MUSCLE, PERCUTANEOUS APPROACH: ICD-10-PCS | Performed by: OBSTETRICS & GYNECOLOGY

## 2017-09-04 PROCEDURE — 770002 HCHG ROOM/CARE - OB PRIVATE (112)

## 2017-09-04 PROCEDURE — 85027 COMPLETE CBC AUTOMATED: CPT

## 2017-09-04 RX ADMIN — IBUPROFEN 600 MG: 600 TABLET, FILM COATED ORAL at 15:46

## 2017-09-04 RX ADMIN — IBUPROFEN 600 MG: 600 TABLET, FILM COATED ORAL at 09:47

## 2017-09-04 RX ADMIN — IBUPROFEN 600 MG: 600 TABLET, FILM COATED ORAL at 21:45

## 2017-09-04 RX ADMIN — DOCUSATE SODIUM 100 MG: 100 CAPSULE ORAL at 15:46

## 2017-09-04 RX ADMIN — OXYCODONE HYDROCHLORIDE 5 MG: 5 TABLET ORAL at 21:57

## 2017-09-04 RX ADMIN — IBUPROFEN 600 MG: 600 TABLET, FILM COATED ORAL at 03:31

## 2017-09-04 RX ADMIN — INFLUENZA A VIRUS A/MICHIGAN/45/2015 X-275 (H1N1) ANTIGEN (FORMALDEHYDE INACTIVATED), INFLUENZA A VIRUS A/HONG KONG/4801/2014 X-263B (H3N2) ANTIGEN (FORMALDEHYDE INACTIVATED), INFLUENZA B VIRUS B/PHUKET/3073/2013 ANTIGEN (FORMALDEHYDE INACTIVATED), AND INFLUENZA B VIRUS B/BRISBANE/60/2008 ANTIGEN (FORMALDEHYDE INACTIVATED) 0.5 ML: 15; 15; 15; 15 INJECTION, SUSPENSION INTRAMUSCULAR at 13:46

## 2017-09-04 ASSESSMENT — PAIN SCALES - GENERAL
PAINLEVEL_OUTOF10: 3
PAINLEVEL_OUTOF10: 4
PAINLEVEL_OUTOF10: 0
PAINLEVEL_OUTOF10: 7
PAINLEVEL_OUTOF10: 4
PAINLEVEL_OUTOF10: 5
PAINLEVEL_OUTOF10: 2
PAINLEVEL_OUTOF10: 2

## 2017-09-04 NOTE — CARE PLAN
Problem: Altered physiologic condition related to immediate post-delivery state and potential for bleeding/hemorrhage  Goal: Patient physiologically stable as evidenced by normal lochia, palpable uterine involution and vital signs within normal limits  Outcome: PROGRESSING AS EXPECTED  Pt has firm fundus, normal lochia and her vital signs are within the defined parameters.    Problem: Alteration in comfort related to episiotomy, vaginal repair and/or after birth pains  Goal: Patient is able to ambulate, care for self and infant  Outcome: PROGRESSING AS EXPECTED  Pt cares well for self and infant.

## 2017-09-04 NOTE — PROGRESS NOTES
Post Partum Progress Note    Name:   Patricia Novoa   Date/Time:  9/4/2017 - 7:02 AM  Chief Admitting Dx:  Pregnancy  Indication for care in labor or delivery  Delivery Type:  vacuum extraction, vag delivery  Post-Op/Post Partum Days #:  1    Subjective:  Abdominal pain: no  Ambulating:   yes  Tolerating liquids:  yes  Tolerating food:  yes common adult  Flatus:   yes  BM:    no  Bleeding:   with a small amount of bleeding  Voiding:   yes  Dizziness:   no  Feeding:   breast    Vitals:    09/03/17 1900 09/03/17 2100 09/04/17 0000 09/04/17 0340   BP: 116/71 113/77 112/72 103/69   Pulse: (!) 50 84 78 81   Resp:  18 18 16   Temp: 36.3 °C (97.4 °F) 37.2 °C (98.9 °F) 36.7 °C (98 °F) 36.4 °C (97.6 °F)   TempSrc:       SpO2:  96% 97% 93%   Weight:       Height:           Exam:  Breast: Tenderness no and Engorged no  Abdomen: Abdomen soft, non-tender. BS normal. No masses,  No organomegaly  Fundal Tenderness:  no  Fundus Firm: yes  Incision: none  Below umbilicus: yes  Perineum: perineum intact  Lochia: mild  Extremities: trace extremities, peripheral pulses and reflexes normal    Meds:  Current Facility-Administered Medications   Medication Dose   • ondansetron (ZOFRAN ODT) dispertab 4 mg  4 mg    Or   • ondansetron (ZOFRAN) syringe/vial injection 4 mg  4 mg   • oxytocin (PITOCIN) infusion (for postpartum)   mL/hr   • ibuprofen (MOTRIN) tablet 800 mg  800 mg   • acetaminophen (TYLENOL) tablet 325 mg  325 mg   • oxycodone immediate-release (ROXICODONE) tablet 5 mg  5 mg   • oxycodone immediate release (ROXICODONE) tablet 10 mg  10 mg   • ephedrine injection 5 mg  5 mg   • D5LR infusion     • acetaminophen (TYLENOL) tablet 650 mg  650 mg   • LR infusion     • PRN oxytocin (PITOCIN) (20 Units/1000 mL) PRN for excessive uterine bleeding - See Admin Instr  125-999 mL/hr   • misoprostol (CYTOTEC) tablet 600 mcg  600 mcg   • docusate sodium (COLACE) capsule 100 mg  100 mg   • ibuprofen (MOTRIN) tablet 600 mg  600 mg   •  oxycodone-acetaminophen (PERCOCET) 5-325 MG per tablet 1 Tab  1 Tab   • oxycodone-acetaminophen (PERCOCET-10)  MG per tablet 1 Tab  1 Tab       Labs:   Recent Labs      09/02/17   1100   WBC  11.2*   RBC  4.32   HEMOGLOBIN  12.6   HEMATOCRIT  37.7   MCV  87.3   MCH  29.2   MCHC  33.4*   RDW  43.0   PLATELETCT  228   MPV  10.9       Assessment:  Chief Admitting Dx:  Pregnancy  Indication for care in labor or delivery  Delivery Type:  VAVD  Tubal Ligation:  no    Plan:  Continue routine post partum care. Advance care, encourage ambulation, pain control, late delivery, so anticipate d/c home tomorrow, PPD#2.     MANOJ Jacobs.

## 2017-09-04 NOTE — PROGRESS NOTES
1900-report received from MARCELO Enriquez RN. Pt experiencing a lot of abdominal, vaginal, and back pain. Pt was medicated for pain about a half hour ago. Fundus firm, slightly displaced to the right, light bleeding. Pt would like to eat and then attempt to get up to the restroom. Infant skin-to-skin with FOB. Questions/concerns addressed.   2010-pt up to bathroom, stable on feet, and able to void. Pt's pain has decreased significantly.   2030-pt transferred to post-partum in stable condition with all belongings and infant in arms. Report to Luz WOODARD.

## 2017-09-04 NOTE — CONSULTS
Infant fussy and fed 1.5 hours ago.  Reviewed nursing and set baby in football position.  Infant crying and disorganized.  Asymmetrical latch and being held in place facilitated breastfeeding. Organized with some chewing behaviors on full breast.  Mom did not experience any pain, nipple comes out round after latch.    Assisted on other side, sustains when held in place, slips easily back and parents need reminding of deep latch.

## 2017-09-04 NOTE — PROGRESS NOTES
Assumed pt care. Assessment complete. VSS. Fundus firm, lochia light. Pt ambulating and voiding without difficulty. Pt would like pain medication upon request. Call light within reach. Will continue to monitor.

## 2017-09-04 NOTE — PROGRESS NOTES
Patient arrived via wheelchair/gurney. Patient is in stable condition from labor and delivery. bands checked. Pt educated on not sleeping with infant. No questions at this time.

## 2017-09-04 NOTE — DELIVERY NOTE
Vaginal Delivery Note    Patricia Novoa is a  2, now para 1, who presented not in labor at 40w4d.  Patient progressed in active labor with pitocin augmentation/induction to cervical exam 100%; cervical dilation 10; station +2 to complete the first stage of labor.  Patient then progressed through second stage and delivered with vacuum a viable male infant over a midline episiotomized perineum.  Nuchal cord not present.  Infant Apgar 8 and 9, and weight 3870 grams.  Patient rquired extensive 2nd stage coaching, developed maternal exhaustion at +3LOT and delivered with Kiwi , without complications During the third stage the placenta was delivered spontaneously and was intact with 3 vessels    Assisted extraction:  Vacuum:  Kiwi vacuum applied.  0 pop-offs.  Seal achieved.  Pressure green , i-application, mid flexion point , no traction, station ,maintained.  Duration 2 contractions.    Perineum repair:  episiotomy repaired with 2-0 Chromic    Analgesia: local 1% lidocaine    Epidural:  yes    Family support:  yes    Infant bonding:  excellent    Estimated blood loss:  350 mL    Sponge count correct:  yes    Patient tolerated the procedure:  excellent

## 2017-09-04 NOTE — CARE PLAN
Problem: Altered physiologic condition related to immediate post-delivery state and potential for bleeding/hemorrhage  Goal: Patient physiologically stable as evidenced by normal lochia, palpable uterine involution and vital signs within normal limits    Intervention: Massage fundus as necessary to prevent excessive lochia  Fundus firm, lochia light      Problem: Potential for postpartum infection related to presence of episiotomy/vaginal tear and/or uterine contamination  Goal: Patient will be absent from signs and symptoms of infection  Outcome: PROGRESSING AS EXPECTED  Pt remains afebrile and free from signs of infection

## 2017-09-05 VITALS
OXYGEN SATURATION: 97 % | HEART RATE: 92 BPM | BODY MASS INDEX: 25.07 KG/M2 | WEIGHT: 156 LBS | DIASTOLIC BLOOD PRESSURE: 86 MMHG | HEIGHT: 66 IN | SYSTOLIC BLOOD PRESSURE: 111 MMHG | RESPIRATION RATE: 18 BRPM | TEMPERATURE: 98.6 F

## 2017-09-05 PROCEDURE — 700112 HCHG RX REV CODE 229: Performed by: OBSTETRICS & GYNECOLOGY

## 2017-09-05 PROCEDURE — A9270 NON-COVERED ITEM OR SERVICE: HCPCS | Performed by: OBSTETRICS & GYNECOLOGY

## 2017-09-05 PROCEDURE — 700102 HCHG RX REV CODE 250 W/ 637 OVERRIDE(OP): Performed by: OBSTETRICS & GYNECOLOGY

## 2017-09-05 RX ORDER — IBUPROFEN 600 MG/1
600 TABLET ORAL EVERY 6 HOURS PRN
Qty: 30 TAB | Refills: 2 | Status: SHIPPED | OUTPATIENT
Start: 2017-09-05 | End: 2017-10-23

## 2017-09-05 RX ORDER — LANOLIN ALCOHOL/MO/W.PET/CERES
325 CREAM (GRAM) TOPICAL 2 TIMES DAILY WITH MEALS
Qty: 60 TAB | Refills: 2 | Status: SHIPPED | OUTPATIENT
Start: 2017-09-05 | End: 2017-10-05

## 2017-09-05 RX ORDER — PSEUDOEPHEDRINE HCL 30 MG
100 TABLET ORAL 2 TIMES DAILY PRN
Qty: 60 CAP | Refills: 2 | Status: SHIPPED | OUTPATIENT
Start: 2017-09-05 | End: 2017-10-23

## 2017-09-05 RX ORDER — OXYCODONE HYDROCHLORIDE AND ACETAMINOPHEN 5; 325 MG/1; MG/1
1 TABLET ORAL EVERY 4 HOURS PRN
Qty: 15 TAB | Refills: 0 | Status: SHIPPED | OUTPATIENT
Start: 2017-09-05 | End: 2017-10-23

## 2017-09-05 RX ADMIN — IBUPROFEN 600 MG: 600 TABLET, FILM COATED ORAL at 06:27

## 2017-09-05 RX ADMIN — DOCUSATE SODIUM 100 MG: 100 CAPSULE ORAL at 06:27

## 2017-09-05 ASSESSMENT — LIFESTYLE VARIABLES: EVER_SMOKED: NEVER

## 2017-09-05 ASSESSMENT — PAIN SCALES - GENERAL
PAINLEVEL_OUTOF10: 2
PAINLEVEL_OUTOF10: 4
PAINLEVEL_OUTOF10: 4
PAINLEVEL_OUTOF10: 2

## 2017-09-05 NOTE — PROGRESS NOTES
1222- Patient stated that she is ready for discharge.  Patient discharged to home, no change noted in condition, via wheelchair with infant and FOB.

## 2017-09-05 NOTE — PROGRESS NOTES
Patient states that she understands all discharge instructions and has no questions at this time.

## 2017-09-05 NOTE — CARE PLAN
Problem: Altered physiologic condition related to immediate post-delivery state and potential for bleeding/hemorrhage  Goal: Patient physiologically stable as evidenced by normal lochia, palpable uterine involution and vital signs within normal limits  Outcome: PROGRESSING AS EXPECTED   Patient in stable condition, vitals WDL, lochia light, and fundus firm.     Problem: Potential for postpartum infection related to presence of episiotomy/vaginal tear and/or uterine contamination  Goal: Patient will be absent from signs and symptoms of infection  Outcome: PROGRESSING AS EXPECTED   Patient is free from any s/s of infection at this time. All vitals are WDL. Patient does not appear to be in any kind of distress at this time. Will continue to monitor.

## 2017-09-05 NOTE — DISCHARGE INSTRUCTIONS
POSTPARTUM DISCHARGE INSTRUCTIONS FOR MOM    YOB: 1990   Age: 27 y.o.               Admit Date: 2017     Discharge Date: 2017  Attending Doctor:  Shikha Lawson M.D.                  Allergies:  Review of patient's allergies indicates no known allergies.    Discharged to home by car. Discharged via wheelchair, hospital escort: Yes.  Special equipment needed: Not Applicable  Belongings with: Personal  Be sure to schedule a follow-up appointment with your primary care doctor or any specialists as instructed.     Discharge Plan:   Influenza Vaccine Given - only chart on this line when given: Influenza Vaccine Given (See MAR)    REASONS TO CALL YOUR OBSTETRICIAN:  1.   Persistent fever or shaking chills (Temperature higher than 100.4)  2.   Heavy bleeding (soaking more than 1 pad per hour); Passing clots  3.   Foul odor from vagina  4.   Mastitis (Breast infection; breast pain, chills, fever, redness)  5.   Urinary pain, burning or frequency  6.   Episiotomy infection  7.   Abdominal incision infection  8.   Severe depression longer than 24 hours    HAND WASHING  · Prior to handling the baby.  · Before breastfeeding or bottle feeding baby.  · After using the bathroom or changing the baby's diaper.    WOUND CARE  Ask your physician for additional care instructions.  In general:    ·  Incision:      · Keep clean and dry.    · Do NOT lift anything heavier than your baby for up to 6 weeks.    · There should not be any opening or pus.      VAGINAL CARE  · Nothing inside vagina for 6 weeks: no sexual intercourse, tampons or douching.  · Bleeding may continue for 2-4 weeks.  Amount may vary.    · Call your physician for heavy bleeding which means soaking more than 1 pad per hour    BIRTH CONTROL  · It is possible to become pregnant at any time after delivery and while breastfeeding.  · Plan to discuss a method of birth control with your physician at your follow up visit. visit.    DIET AND  "ELIMINATION  · Eating more fiber (bran cereal, fruits, and vegetables) and drinking plenty of fluids will help to avoid constipation.  · Urinary frequency after childbirth is normal.    POSTPARTUM BLUES  During the first few days after birth, you may experience a sense of the \"blues\" which may include impatience, irritability or even crying.  These feeling come and go quickly.  However, as many as 1 in 10 women experience emotional symptoms known as postpartum depression.    Postpartum depression:  May start as early as the second or third day after delivery or take several weeks or months to develop.  Symptoms of \"blues\" are present, but are more intense:  Crying spells; loss of appetite; feelings of hopelessness or loss of control; fear of touching the baby; over concern or no concern at all about the baby; little or no concern about your own appearance/caring for yourself; and/or inability to sleep or excessive sleeping.  Contact your physician if you are experiencing any of these symptoms.    Crisis Hotline:  · [place] Crisis Hotline:  0-035-PLGAIBX  Or 1-365.215.1595  · [place] Crisis Hotline:  1-679.214.3658  Or 155-549-1016    PREVENTING SHAKEN BABY:  If you are angry or stressed, PUT THE BABY IN THE CRIB, step away, take some deep breaths, and wait until you are calm to care for the baby.  DO NOT SHAKE THE BABY.  You are not alone, call a supporter for help.    · Crisis Call Center 24/7 crisis line 693-289-1840 or 1-556.848.3246  · You can also text them, text \"ANSWER\" to 675496    QUIT SMOKING/TOBACCO USE:  I understand the use of any tobacco products increases my chance of suffering from future heart disease and could cause other illnesses which may shorten my life. Quitting the use of tobacco products is the single most important thing I can do to improve my health. For further information on smoking / tobacco cessation call a Toll Free Quit Line at 1-940.978.8419 (*[employer]) or " 1-281.615.9308 (American [employer] Association) or you can access the web based program at www.lungusa.org.    · [place] Tobacco Users Help Line:  (909) 890-2769       Toll Free: 1-534.746.4048  · Quit Tobacco Program [facility] (796)319-6118    DEPRESSION / SUICIDE RISK:  As you are discharged from this [facility], it is important to learn how to keep safe from harming yourself.    Recognize the warning signs:  · Abrupt changes in personality, positive or negative- including increase in energy   · Giving away possessions  · Change in eating patterns- significant weight changes-  positive or negative  · Change in sleeping patterns- unable to sleep or sleeping all the time   · Unwillingness or inability to communicate  · Depression  · Unusual sadness, discouragement and loneliness  · Talk of wanting to die  · Neglect of personal appearance   · Rebelliousness- reckless behavior  · Withdrawal from people/activities they love  · Confusion- inability to concentrate     If you or a loved one observes any of these behaviors or has concerns about self-harm, here's what you can do:  · Talk about it- your feelings and reasons for harming yourself  · Remove any means that you might use to hurt yourself (examples: pills, rope, extension cords, firearm)  · Get professional help from the community (Mental Health, Substance Abuse, psychological counseling)  · Do not be alone:Call your Safe Contact- someone whom you trust who will be there for you.  · Call your local CRISIS HOTLINE 390-7917 or 444-805-6230  · Call your local Children's Mobile Crisis Response Team [place] (502) 968-7568 or www.[employer]  · Call the toll free National Suicide Prevention Hotlines   · National Suicide Prevention Lifeline 565-225-YJTO (7179)  · National Hope Line Network 800-SUICIDE (119-1286)    DISCHARGE SURVEY:  Thank you for choosing [facility].  We hope we provided you with very good care.  You may be  receiving a survey in the mail.  Please fill it out.  Your opinion is valuable to us.    ADDITIONAL EDUCATIONAL MATERIALS GIVEN TO PATIENT:        My signature on this form indicates that:  1.  I have reviewed and understand the above information  2.  My questions regarding this information have been answered to my satisfaction.  3.  I have formulated a plan with my discharge nurse to obtain my prescribed medication for home.

## 2017-09-05 NOTE — DISCHARGE SUMMARY
Discharge Summary:      Patricia Novoa      Admit Date:   2017  Discharge Date:  2017     Admitting diagnosis:  Pregnancy  Indication for care in labor or delivery  Discharge Diagnosis: Status post vacuum extraction.  Pregnancy Complications: none  Tubal Ligation:  no        History:  No past medical history on file.  OB History    Para Term  AB Living   1             SAB TAB Ectopic Molar Multiple Live Births                    # Outcome Date GA Lbr [place]/2nd Weight Sex Delivery Anes PTL Lv   1 Current                    Review of patient's allergies indicates no known allergies.  Patient Active Problem List    Diagnosis Date Noted   • Indication for care in labor or delivery 2017   • Normal pregnancy 2017   • Encounter for supervision of normal first pregnancy in third trimester 2017        Hospital Course:   27 y.o. , now para 1, was admitted with the above mentioned diagnosis, underwent Induction of Labor, vacuum extraction. Patient postpartum course was unremarkable, with progressive advancement in diet , ambulation and toleration of oral analgesia. Patient without complaints today and desires discharge.      Vitals:    17 0000 17 0340 17 0800 17   BP: 112/72 103/69 103/70 103/72   Pulse: 78 81 96 92   Resp: 18 16 20 18   Temp: 36.7 °C (98 °F) 36.4 °C (97.6 °F) 36.2 °C (97.2 °F) 36.9 °C (98.5 °F)   TempSrc:       SpO2: 97% 93% 95% 95%   Weight:       Height:           Current Facility-Administered Medications   Medication Dose   • ondansetron (ZOFRAN ODT) dispertab 4 mg  4 mg    Or   • ondansetron (ZOFRAN) syringe/vial injection 4 mg  4 mg   • oxytocin (PITOCIN) infusion (for postpartum)   mL/hr   • ibuprofen (MOTRIN) tablet 800 mg  800 mg   • acetaminophen (TYLENOL) tablet 325 mg  325 mg   • oxycodone immediate-release (ROXICODONE) tablet 5 mg  5 mg   • oxycodone immediate release (ROXICODONE) tablet 10 mg  10 mg   • ephedrine  injection 5 mg  5 mg   • D5LR infusion     • acetaminophen (TYLENOL) tablet 650 mg  650 mg   • LR infusion     • PRN oxytocin (PITOCIN) (20 Units/1000 mL) PRN for excessive uterine bleeding - See Admin Instr  125-999 mL/hr   • misoprostol (CYTOTEC) tablet 600 mcg  600 mcg   • docusate sodium (COLACE) capsule 100 mg  100 mg   • ibuprofen (MOTRIN) tablet 600 mg  600 mg   • oxycodone-acetaminophen (PERCOCET) 5-325 MG per tablet 1 Tab  1 Tab   • oxycodone-acetaminophen (PERCOCET-10)  MG per tablet 1 Tab  1 Tab       Exam:  Breast Exam: negative  Abdomen: Abdomen soft, non-tender. BS normal. No masses,  No organomegaly  Fundus Non Tender: yes  Incision: none  Perineum: repair well approximated  Extremity: extremities, peripheral pulses and reflexes normal     Labs:  Recent Labs      09/02/17   1100  09/04/17   0916   WBC  11.2*  23.2*   RBC  4.32  3.44*   HEMOGLOBIN  12.6  10.2*   HEMATOCRIT  37.7  30.5*   MCV  87.3  88.1   MCH  29.2  29.1   MCHC  33.4*  33.0*   RDW  43.0  44.7   PLATELETCT  228  191   MPV  10.9  11.1        Activity:   Discharge to home  Pelvic Rest x 6 weeks    Assessment:  normal postpartum course  Discharge Assessment: No areas of skin breakdown/redness; surgical incision intact/healing     Follow up: .[facility] or [facility] in 5 weeks for vaginal ; 1 week for incision check.      Discharge Meds:   Current Outpatient Prescriptions   Medication Sig Dispense Refill   • oxycodone-acetaminophen (PERCOCET) 5-325 MG Tab Take 1 Tab by mouth every four hours as needed (for Moderate Pain (Pain Scale 4-6) after delivery). 15 Tab 0   • ibuprofen (MOTRIN) 600 MG Tab Take 1 Tab by mouth every 6 hours as needed (For cramping after delivery; do not give if patient is receiving ketorolac (Toradol)). 30 Tab 2   • docusate sodium 100 MG Cap Take 100 mg by mouth 2 times a day as needed for Constipation. 60 Cap 2   • ferrous sulfate 325 (65 Fe) MG EC tablet Take 1 Tab by mouth 2 times a day, with meals for  30 days. 60 Tab 2       Kristie Bowman C.N.M.

## 2017-09-05 NOTE — PROGRESS NOTES
Mom concerned with infants cry and poor sustaining at the breast.    Plan discussed for home:  Offer breast, spend time S2S, nunez him to the breast as he feels ready.  Bottle feed according to volumes per age/weight provided  Double pump 8x/24 hours.  In a few days mom will be replacing her milk for the formula.  Pump settings reviewed for home use.    Sleeping/feeding routine discussed with parents while pumping and supplementing    OP consultations available through her insurance

## 2017-09-05 NOTE — PROGRESS NOTES
0194- Bedside report received from YAMILET Cabrera.  Patient denied needs.  8573- Patient assessment done.  Patient stated that she is voiding without difficulty and passing flatus.  Patient denied dizziness and stated that she is walking without difficulty.  Discussed pain management plan and patient prefers to call for pain intervention as needed.  Reviewed plan of care.  FOB at bedside.

## 2017-10-23 ENCOUNTER — POST PARTUM (OUTPATIENT)
Dept: OBGYN | Facility: MEDICAL CENTER | Age: 27
End: 2017-10-23
Payer: COMMERCIAL

## 2017-10-23 VITALS
SYSTOLIC BLOOD PRESSURE: 106 MMHG | DIASTOLIC BLOOD PRESSURE: 70 MMHG | WEIGHT: 129 LBS | HEIGHT: 66 IN | BODY MASS INDEX: 20.73 KG/M2

## 2017-10-23 PROBLEM — Z34.03 ENCOUNTER FOR SUPERVISION OF NORMAL FIRST PREGNANCY IN THIRD TRIMESTER: Status: RESOLVED | Noted: 2017-06-02 | Resolved: 2017-10-23

## 2017-10-23 PROBLEM — Z34.90 NORMAL PREGNANCY: Status: RESOLVED | Noted: 2017-07-14 | Resolved: 2017-10-23

## 2017-10-23 PROCEDURE — 90050 PR POSTPARTUM VISIT: CPT | Performed by: NURSE PRACTITIONER

## 2017-10-23 ASSESSMENT — ENCOUNTER SYMPTOMS
CONSTITUTIONAL NEGATIVE: 1
MUSCULOSKELETAL NEGATIVE: 1
PALPITATIONS: 1
PSYCHIATRIC NEGATIVE: 1
GASTROINTESTINAL NEGATIVE: 1
DEPRESSION: 0
RESPIRATORY NEGATIVE: 1
EYES NEGATIVE: 1
NEUROLOGICAL NEGATIVE: 1

## 2017-10-23 NOTE — PROGRESS NOTES
"Subjective:      Patricia Novoa is a 27 y.o. female who presents for 6 week PP visit           HPI     Subjective   Subjective:    Patricia Novoa is a 27 y.o. female who presents for her postpartum exam 6 weeks following . Her prenatal course was uneventful. Her postpartum course was complicated by none. She denies dysuria, vaginal bleeding, odor, itching or breast problems. She is breastfeeding. She insure about  her birth control method. Reports  sex prior to this appointment with condoms. Eating a regular diet without difficulty. Bowel movement are Normal.  The patient is not having any pain. Patient denies  postpartum depression.     Problem List     Patient Active Problem List    Diagnosis Date Noted   • Postpartum care and examination of lactating mother 10/23/2017       Review of Systems   Constitutional: Negative.    HENT: Negative.    Eyes: Negative.    Respiratory: Negative.    Cardiovascular: Positive for palpitations.        Hx of palpitations   Gastrointestinal: Negative.    Genitourinary: Negative.    Musculoskeletal: Negative.    Skin: Negative.    Neurological: Negative.    Endo/Heme/Allergies: Negative.    Psychiatric/Behavioral: Negative.  Negative for depression and suicidal ideas.   All other systems reviewed and are negative.         Objective:     /70   Ht 1.676 m (5' 6\")   Wt 58.5 kg (129 lb)   LMP 2016   Breastfeeding? Yes   BMI 20.82 kg/m²      Physical Exam   Constitutional: She is oriented to person, place, and time. She appears well-developed and well-nourished.   HENT:   Head: Normocephalic.   Eyes: Conjunctivae are normal. Pupils are equal, round, and reactive to light.   Neck: Normal range of motion. Neck supple. No thyromegaly present.   Cardiovascular: Normal rate, regular rhythm, normal heart sounds and intact distal pulses.    Pulmonary/Chest: Right breast exhibits no inverted nipple, no mass, no nipple discharge, no skin change and no tenderness. Left " breast exhibits no inverted nipple, no mass, no nipple discharge, no skin change and no tenderness. Breasts are symmetrical.   Abdominal: Hernia confirmed negative in the right inguinal area and confirmed negative in the left inguinal area.   Genitourinary: Vagina normal and uterus normal. Pelvic exam was performed with patient supine. No labial fusion. There is no rash, tenderness, lesion or injury on the right labia. There is no rash, tenderness, lesion or injury on the left labia. Uterus is not deviated, not enlarged, not fixed and not tender. Cervix exhibits no motion tenderness. Right adnexum displays no mass, no tenderness and no fullness. Left adnexum displays no mass, no tenderness and no fullness. No erythema, tenderness or bleeding in the vagina. No foreign body in the vagina. No signs of injury around the vagina. No vaginal discharge found.   Musculoskeletal: Normal range of motion.   Lymphadenopathy:        Right: No inguinal adenopathy present.        Left: No inguinal adenopathy present.   Neurological: She is alert and oriented to person, place, and time. She has normal reflexes.   Skin: Skin is warm and dry.   Psychiatric: She has a normal mood and affect. Her behavior is normal. Judgment and thought content normal.   Nursing note and vitals reviewed.              Assessment/Plan:     1. Postpartum care and examination of lactating mother   breast feeding exclusively     Unsure about her Birth control at this time   Information and brochures are given today   Assessment   Assessment:    1. PP care of lactating women   2. Exam WNL   3. Pap WNL on 2/17/17  4. Desires contraception : unsure what type at this time    Plan   Plan:    1. Breastfeeding support   2. Continue PNV   3. Contraceptive counseling - discussed briefly all methods and brochures given  4. Encouraged condom use: yes  5. Discussed diet, exercise and resumption of sexual activity   6. Preconception guidance for next pregnancy if  applicable. Low risk factors. Folic acid for all women of childbearing age.   7.  Follow up needed and 2/2018 for annual   8.  Smoking/etoh/drug screening: no use

## 2017-11-14 ENCOUNTER — NON-PROVIDER VISIT (OUTPATIENT)
Dept: OCCUPATIONAL MEDICINE | Facility: CLINIC | Age: 27
End: 2017-11-14
Payer: COMMERCIAL

## 2017-11-14 ENCOUNTER — HOSPITAL ENCOUNTER (EMERGENCY)
Facility: MEDICAL CENTER | Age: 27
End: 2017-11-14
Attending: EMERGENCY MEDICINE
Payer: COMMERCIAL

## 2017-11-14 VITALS
RESPIRATION RATE: 15 BRPM | BODY MASS INDEX: 20.34 KG/M2 | HEART RATE: 66 BPM | TEMPERATURE: 98.1 F | OXYGEN SATURATION: 94 % | HEIGHT: 66 IN | DIASTOLIC BLOOD PRESSURE: 70 MMHG | WEIGHT: 126.54 LBS | SYSTOLIC BLOOD PRESSURE: 109 MMHG

## 2017-11-14 DIAGNOSIS — Z57.8 EMPLOYEE EXPOSURE TO BLOOD: ICD-10-CM

## 2017-11-14 DIAGNOSIS — Z02.83 ENCOUNTER FOR DRUG SCREENING: ICD-10-CM

## 2017-11-14 DIAGNOSIS — Z02.1 PRE-EMPLOYMENT DRUG SCREENING: ICD-10-CM

## 2017-11-14 LAB
ALBUMIN SERPL BCP-MCNC: 4.5 G/DL (ref 3.2–4.9)
ALBUMIN/GLOB SERPL: 1.5 G/DL
ALP SERPL-CCNC: 85 U/L (ref 30–99)
ALT SERPL-CCNC: 18 U/L (ref 2–50)
ANION GAP SERPL CALC-SCNC: 13 MMOL/L (ref 0–11.9)
AST SERPL-CCNC: 16 U/L (ref 12–45)
BILIRUB SERPL-MCNC: 0.6 MG/DL (ref 0.1–1.5)
BUN SERPL-MCNC: 13 MG/DL (ref 8–22)
CALCIUM SERPL-MCNC: 9.5 MG/DL (ref 8.5–10.5)
CHLORIDE SERPL-SCNC: 107 MMOL/L (ref 96–112)
CO2 SERPL-SCNC: 21 MMOL/L (ref 20–33)
CREAT SERPL-MCNC: 0.58 MG/DL (ref 0.5–1.4)
ERYTHROCYTE [DISTWIDTH] IN BLOOD BY AUTOMATED COUNT: 41.6 FL (ref 35.9–50)
GFR SERPL CREATININE-BSD FRML MDRD: >60 ML/MIN/1.73 M 2
GLOBULIN SER CALC-MCNC: 3 G/DL (ref 1.9–3.5)
GLUCOSE SERPL-MCNC: 76 MG/DL (ref 65–99)
HBV CORE AB SERPL QL IA: NEGATIVE
HBV SURFACE AB SERPL IA-ACNC: 217.6 MIU/ML (ref 0–10)
HBV SURFACE AG SER QL: NEGATIVE
HCT VFR BLD AUTO: 41.9 % (ref 37–47)
HCV AB SER QL: NEGATIVE
HGB BLD-MCNC: 13.7 G/DL (ref 12–16)
HIV 1+2 AB+HIV1 P24 AG SERPL QL IA: NON REACTIVE
MCH RBC QN AUTO: 27.7 PG (ref 27–33)
MCHC RBC AUTO-ENTMCNC: 32.7 G/DL (ref 33.6–35)
MCV RBC AUTO: 84.8 FL (ref 81.4–97.8)
PLATELET # BLD AUTO: 279 K/UL (ref 164–446)
PMV BLD AUTO: 9.7 FL (ref 9–12.9)
POTASSIUM SERPL-SCNC: 3.6 MMOL/L (ref 3.6–5.5)
PROT SERPL-MCNC: 7.5 G/DL (ref 6–8.2)
RBC # BLD AUTO: 4.94 M/UL (ref 4.2–5.4)
SODIUM SERPL-SCNC: 141 MMOL/L (ref 135–145)
WBC # BLD AUTO: 5.1 K/UL (ref 4.8–10.8)

## 2017-11-14 PROCEDURE — 82075 ASSAY OF BREATH ETHANOL: CPT | Performed by: INTERNAL MEDICINE

## 2017-11-14 PROCEDURE — 80053 COMPREHEN METABOLIC PANEL: CPT

## 2017-11-14 PROCEDURE — 87389 HIV-1 AG W/HIV-1&-2 AB AG IA: CPT

## 2017-11-14 PROCEDURE — 85027 COMPLETE CBC AUTOMATED: CPT

## 2017-11-14 PROCEDURE — 86706 HEP B SURFACE ANTIBODY: CPT

## 2017-11-14 PROCEDURE — 80305 DRUG TEST PRSMV DIR OPT OBS: CPT | Performed by: INTERNAL MEDICINE

## 2017-11-14 PROCEDURE — 86704 HEP B CORE ANTIBODY TOTAL: CPT

## 2017-11-14 PROCEDURE — 87340 HEPATITIS B SURFACE AG IA: CPT

## 2017-11-14 PROCEDURE — 99026 IN-HOSPITAL ON CALL SERVICE: CPT | Performed by: INTERNAL MEDICINE

## 2017-11-14 PROCEDURE — 99283 EMERGENCY DEPT VISIT LOW MDM: CPT

## 2017-11-14 PROCEDURE — 86803 HEPATITIS C AB TEST: CPT

## 2017-11-14 SDOH — HEALTH STABILITY - PHYSICAL HEALTH: OCCUPATIONAL EXPOSURE TO OTHER RISK FACTORS: Z57.8

## 2017-11-14 ASSESSMENT — PAIN SCALES - GENERAL: PAINLEVEL_OUTOF10: 0

## 2017-11-14 NOTE — LETTER
"  FORM C-4:  EMPLOYEE’S CLAIM FOR COMPENSATION/ REPORT OF INITIAL TREATMENT  EMPLOYEE’S CLAIM - PROVIDE ALL INFORMATION REQUESTED   First Name  Patricia Last Name  Bright Birthdate             Age  1990 27 y.o. Sex  female Claim Number   Home Employee Address  [address]                                     Zip  [address] Height  1.676 m (5' 6\") Weight  57.4 kg (126 lb 8.7 oz) [facility]     Mailing Employee Address                           [address]               Zip  [address] Telephone  [phone] (home)  Primary Language Spoken  ENGLISH   Insurer  UNABLE TO OBTAIN Third Party   WORKERS CHOICE Employee's Occupation (Job Title) When Injury or Occupational Disease Occurred  RN   Employer's Name  [employer] Telephone   [phone]   Employer Address  [facility] [address] [facility]  [address]   Date of Injury  11/14/2017       Hour of Injury  12:20 PM Date Employer Notified  11/14/2017 Last Day of Work after Injury or Occupational Disease  11/14/2017 Supervisor to Whom Injury Reported  EILEEN GARNICA   Address or Location of Accident (if applicable)  [address] [place] [address]   What were you doing at the time of accident? (if applicable)  GIVING INSULIN    How did this injury or occupational disease occur? Be specific and answer in detail. Use additional sheet if necessary)  FINGERSTICK WITH DIRTY NEEDLE   If you believe that you have an occupational disease, when did you first have knowledge of the disability and it relationship to your employment?  N/A Witnesses to the Accident  N/A     Nature of Injury or Occupational Disease  Contusion  Part(s) of Body Injured or Affected  Finger (R), N/A, N/A    I certify that the above is true and correct to the best of my knowledge and that I have provided this information in order to obtain the benefits of [place] or [place]).  I hereby authorize any physician, chiropractor, surgeon, practitioner, or other person, any hospital, including [facility] or [facility] hospital, any medical service organization, any insurance company, or other institution or organization to release to each other, any medical or other information, including benefits paid or payable, pertinent to this injury or disease, except information relative to diagnosis, treatment and/or counseling for AIDS, psychological conditions, alcohol or controlled substances, for which I must give specific authorization.  A Photostat of this authorization shall be as valid as the original.   Date  11/14/2017 [facility] Employee’s Signature   THIS REPORT MUST BE COMPLETED AND MAILED WITHIN 3 WORKING DAYS OF TREATMENT   Place  [facility], EMERGENCY DEPT  Name of Facility   [facility]   Date  11/14/2017 Diagnosis  (Z57.8) Employee exposure to blood Is there evidence the injured employee was under the influence of alcohol and/or another controlled substance at the time of accident?   Hour  2:03 PM Description of Injury or Disease  Employee exposure to blood     Treatment     Have you advised the patient to remain off work five days or more?             X-Ray Findings      If Yes   From Date    To Date      From information given by the employee, together with medical evidence, can you directly connect this injury or occupational disease as job incurred?    If No, is the employee capable of: Full Duty    Modified Duty      Is additional medical care by a physician indicated?    If Modified Duty, Specify any Limitations / Restrictions        Do you know of any previous injury or disease contributing to this condition or occupational disease?      Date  11/14/2017 Print Doctor’s Name  Brady Carney I certify the employer’s copy of this form was mailed on:  "  Address  [address] [id number] [phone] Insurer’s Use Only   [facility]  [address]    Provider’s Tax ID Number    Telephone  Dept: [phone]    Doctor’s Signature    Degree       Original - TREATING PHYSICIAN OR CHIROPRACTOR   Pg 2-Insurer/TPA   Pg 3-Employer   Pg 4-Employee                                                                                                  Form C-4 (rev01/03)     BRIEF DESCRIPTION OF RIGHTS AND BENEFITS  (Pursuant to [place] 616C.050)    Notice of Injury or Occupational Disease (Incident Report Form C-1): If an injury or occupational disease (OD) arises out of and in the course of employment, you must provide written notice to your employer as soon as practicable, but no later than 7 days after the accident or OD. Your employer shall maintain a sufficient supply of the required forms.    Claim for Compensation (Form C-4): If medical treatment is sought, the form C-4 is available at the place of initial treatment. A completed \"Claim for Compensation\" (Form C-4) must be filed within 90 days after an accident or OD. The treating physician or chiropractor must, within 3 working days after treatment, complete and mail to the employer, the employer's insurer and third-party , the Claim for Compensation.    Medical Treatment: If you require medical treatment for your on-the-job injury or OD, you may be required to select a physician or chiropractor from a list provided by your workers’ compensation insurer, if it has contracted with an Organization for Managed Care (MCO) or Preferred Provider Organization (PPO) or providers of health care. If your employer has not entered into a contract with an MCO or PPO, you may select a physician or chiropractor from the Panel of Physicians and Chiropractors. Any medical costs related to your industrial injury or OD will be paid by your insurer.    Temporary Total Disability (TTD): If your doctor has " certified that you are unable to work for a period of at least 5 consecutive days, or 5 cumulative days in a 20-day period, or places restrictions on you that your employer does not accommodate, you may be entitled to TTD compensation.    Temporary Partial Disability (TPD): If the wage you receive upon reemployment is less than the compensation for TTD to which you are entitled, the insurer may be required to pay you TPD compensation to make up the difference. TPD can only be paid for a maximum of 24 months.    Permanent Partial Disability (PPD): When your medical condition is stable and there is an indication of a PPD as a result of your injury or OD, within 30 days, your insurer must arrange for an evaluation by a rating physician or chiropractor to determine the degree of your PPD. The amount of your PPD award depends on the date of injury, the results of the PPD evaluation and your age and wage.    Permanent Total Disability (PTD): If you are medically certified by a treating physician or chiropractor as permanently and totally disabled and have been granted a PTD status by your insurer, you are entitled to receive monthly benefits not to exceed 66 2/3% of your average monthly wage. The amount of your PTD payments is subject to reduction if you previously received a PPD award.    Vocational Rehabilitation Services: You may be eligible for vocational rehabilitation services if you are unable to return to the job due to a permanent physical impairment or permanent restrictions as a result of your injury or occupational disease.    Transportation and Per Janet Reimbursement: You may be eligible for travel expenses and per janet associated with medical treatment.  Reopening: You may be able to reopen your claim if your condition worsens after claim closure.    Appeal Process: If you disagree with a written determination issued by the insurer or the insurer does not respond to your request, you may appeal to the  Department of Administration, , by following the instructions contained in your determination letter. You must appeal the determination within 70 days from the date of the determination letter at [address], [address], [place], [place] [address], or [address], [address], [place], [place] [address]. If you disagree with the  decision, you may appeal to the Department of Administration, . You must file your appeal within 30 days from the date of the  decision letter at [address], [address], [place], [place] [address], or [address], [address], [place], [place] [address]. If you disagree with a decision of an , you may file a petition for judicial review with the District Court. You must do so within 30 days of the Appeal Officer’s decision. You may be represented by an  at your own expense or you may contact the [facility] for possible representation.    [place]  for Injured Workers (NAIW): If you disagree with a  decision, you may request that NAIW represent you without charge at an  Hearing. For information regarding denial of benefits, you may contact the [facility] at: [address], [address], [place] [address], [phone], or [address], [place] [address], [phone]    To File a Complaint with the Division: If you wish to file a complaint with the  of the Division of Industrial Relations (DIR), please contact the Workers’ Compensation Section, [address], [address], [place], [place] [address], telephone [phone], or [address], [address], [place] [address], telephone [phone].    For assistance with Workers’ Compensation Issues: you may contact the Office of the Governor Consumer Health Assistance, [address], [address], [place], [place] [address], Toll  Free 1-745.985.7051, Web site: http://catalino..nv., E-mail wilbert@catalino..nv.                                                                                                                                                                               __________________________________________________________________                                    _________________            Employee Name / Signature                                                                                                                            Date                                       D-2 (rev. 10/07)

## 2017-11-14 NOTE — ED PROVIDER NOTES
"ED Provider Note    Scribed for Brady Carney M.D. by Hubert Pope. 11/14/2017, 1:50 PM.    Primary care provider: Pcp Pt States None  Means of arrival: walk in  History obtained from: Patient  History limited by: None    CHIEF COMPLAINT  Chief Complaint   Patient presents with   • Body Fluid Exposure     Pt reports to have stuck by dirty needle/ right index finger/while at work       HPI  Patricia Novoa is a 27 y.o. female who presents to the Emergency Department for evaluation of body fluid exposure. 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 The patient who she was caring for has a known history of Hepatitis C. Patient glenny any other medical complaints at this time.     REVIEW OF SYSTEMS  Review of Systems   Endo/Heme/Allergies:        Positive body fluid exposure.    E.    PAST MEDICAL HISTORY   None noted    SURGICAL HISTORY  patient denies any surgical history    SOCIAL HISTORY  Social History   Substance Use Topics   • Smoking status: Never Smoker   • Smokeless tobacco: Never Used   • Alcohol use None noted       FAMILY HISTORY  None noted    CURRENT MEDICATIONS  No current facility-administered medications for this encounter.     Current Outpatient Prescriptions:   •  Misc. Devices (BREAST PUMP) Misc, 1 Each by Other route every day. Double Electric Pump, Medically Necessary  Z39.1, Disp: 1 Each, Rfl: 0    ALLERGIES  No Known Allergies    PHYSICAL EXAM  VITAL SIGNS: /80   Pulse 73   Temp 36.7 °C (98.1 °F)   Resp 14   Ht 1.676 m (5' 6\")   Wt 57.4 kg (126 lb 8.7 oz)   LMP 11/23/2016   SpO2 94%   BMI 20.42 kg/m²     Constitutional: Well developed, Well nourished, No acute distress, Non-toxic appearance.   Skin: Warm, Dry, No erythema, No punctate noted on right index finger.     LABS  Results for orders placed or performed during the hospital encounter of 11/14/17   EXPOSED PERSON-SOURCE PT POSITIVE OR UNK " (BLOOD & BODY FLUID EXPOSURE)   Result Value Ref Range    WBC 5.1 4.8 - 10.8 K/uL    RBC 4.94 4.20 - 5.40 M/uL    Hemoglobin 13.7 12.0 - 16.0 g/dL    Hematocrit 41.9 37.0 - 47.0 %    MCV 84.8 81.4 - 97.8 fL    MCH 27.7 27.0 - 33.0 pg    MCHC 32.7 (L) 33.6 - 35.0 g/dL    RDW 41.6 35.9 - 50.0 fL    Platelet Count 279 164 - 446 K/uL    MPV 9.7 9.0 - 12.9 fL    Hep B Surface Antibody Quant 217.60 (H) 0.00 - 10.00 mIU/mL    Sodium 141 135 - 145 mmol/L    Potassium 3.6 3.6 - 5.5 mmol/L    Chloride 107 96 - 112 mmol/L    Co2 21 20 - 33 mmol/L    Anion Gap 13.0 (H) 0.0 - 11.9    Glucose 76 65 - 99 mg/dL    Bun 13 8 - 22 mg/dL    Creatinine 0.58 0.50 - 1.40 mg/dL    Calcium 9.5 8.5 - 10.5 mg/dL    AST(SGOT) 16 12 - 45 U/L    ALT(SGPT) 18 2 - 50 U/L    Alkaline Phosphatase 85 30 - 99 U/L    Total Bilirubin 0.6 0.1 - 1.5 mg/dL    Albumin 4.5 3.2 - 4.9 g/dL    Total Protein 7.5 6.0 - 8.2 g/dL    Globulin 3.0 1.9 - 3.5 g/dL    A-G Ratio 1.5 g/dL    Hepatitis B Surface Antigen Negative Negative    Hepatitis C Antibody Negative Negative    Hepatitis B Ccore Ab, Total Negative Negative    HIV Ag/Ab Combo Assay Non Reactive Non Reactive   ESTIMATED GFR   Result Value Ref Range    GFR If African American >60 >60 mL/min/1.73 m 2    GFR If Non African American >60 >60 mL/min/1.73 m 2   All labs reviewed by me.    COURSE & MEDICAL DECISION MAKING  Nursing notes, VS, PMSFHx reviewed in chart.    1:50 PM - Patient seen and examined at bedside. Discussed evaluation of her blood work for HIV and Hepatitis C. Patient is currently beast feeding and is concerned for her ability to complete this activity. I informed her that we will wait to evalute her blood work before recommendation on this. Patient verbalizes understanding and agreement to this plan of care. Ordered Blood and body fluid exposure to evaluate her symptoms.     3:10 PM - Source patient's sample still has not been taken. Nurse is working on coordinating.     5:19 PM - Source  patient sample has been taken.  Results in process.     5:56 PM - Recheck: Patient re-evaluated at [facility]. Patient's lab results discussed. Discussed patient's condition and treatment plan. Patient will be discharged with instructions and provided with strict return precautions. Advised to follow up with occupational health. Instructed to return to Emergency Department immediately if any new or worsening symptoms.    Decision Making:  Patient presents today for evaluation of body fluid exposure while on shift at [facility]. The patient's results were processed quickly, however there was significant delay in processing the source patient's laboratory sample and results. Multiple attempts were made by nursing staff to expedite the process, but the delay extended beyond 4 hours. Nurse is documenting with [facility] report for administrative review. Discussed results with the patient in the ED who is discharged home and instructed to follow up with [facility].     The patient will return for new or worsening symptoms and is stable at the time of discharge.    The patient is referred to a primary physician for blood pressure management, diabetic screening, and for all other preventative health concerns.    DISPOSITION:  Patient will be discharged home in stable condition.    FOLLOW UP:  [facility] [email or web address] [address] [address]  [phone]    Schedule an appointment as soon as possible for a visit        OUTPATIENT MEDICATIONS:  Discharge Medication List as of 11/14/2017  6:07 PM            FINAL IMPRESSION  1. Employee exposure to blood          Hubert MENDOZA (Jammieibtwin), am scribing for, and in the presence of, Brady Carney M.D..    Electronically signed by: Hubert Pope (Zhang), 11/14/2017    Brady MENDOZA M.D. personally performed the services described in this documentation, as scribed by Hubert Pope in my presence, and it is both accurate and complete.    The note  accurately reflects work and decisions made by me.  Brady Carney  11/14/2017  6:47 PM

## 2017-11-14 NOTE — DISCHARGE INSTRUCTIONS
Body Fluid Exposure  Body fluid exposure happens most often with blood and sexual contact. It also happens by sharing needles. Most of the time this type of exposure does not cause any problems. Several infections can be passed by body fluid exposure. The biggest risk is for getting hepatitis B or hepatitis C, or HIV infection (the virus that causes AIDS).  Hepatitis B and hepatitis C cause a serious liver infection. This can cause death. Immunization against hepatitis B can prevent this infection. Your caregiver may want you to get these shots. All health care workers or those exposed to human body fluids regularly should be immunized against hepatitis B. This requires a first dose with booster doses at 1 and 6 months. There is no hepatitis C vaccine. There is no vaccine against AIDS.  The risk of transmitting HIV infection by a single body fluid exposure is very small. Blood exposure to mucous membranes has on average caused 1 HIV infection for every 1,000 exposures if the source is known to carry HIV. About 1 in 300 needle stick recipients from a known HIV positive person get infected. Infection with HIV is very serious. High risk exposures should consider post-exposure preventive treatment. Treatment reduces the chance of getting an HIV infection.  A combination of anti-HIV drugs is recommended for risky exposures. Preventive treatment should be started as soon as possible. It is usually continued for 4 weeks. Blood tests for HIV should be taken immediately, and repeated at 3 to 6 weeks and again at 3 and 6 months.   Arrange follow-up with your caregiver.  Document Released: 12/18/2006 Document Revised: 03/11/2013 Document Reviewed: 06/06/2008  Productiv® Patient Information ©2014 [employer].

## 2017-11-14 NOTE — ED NOTES
Source pts MR- [id number]. Source patient blood work ordered on patient and lab called and notified of need to draw pt.

## 2017-11-14 NOTE — LETTER
"  FORM C-4:  EMPLOYEE’S CLAIM FOR COMPENSATION/ REPORT OF INITIAL TREATMENT  EMPLOYEE’S CLAIM - PROVIDE ALL INFORMATION REQUESTED   First Name  Patricia Last Name  Bright Birthdate             Age  1990 27 y.o. Sex  female Claim Number   Home Employee Address  [address]                                     Zip  [address] Height  1.676 m (5' 6\") Weight  57.4 kg (126 lb 8.7 oz) [facility]     Mailing Employee Address                           [address]               Zip  [address] Telephone  [phone] (home)  Primary Language Spoken  ENGLISH   Insurer  UNABLE TO OBTAIN Third Party   WORKERS CHOICE Employee's Occupation (Job Title) When Injury or Occupational Disease Occurred  RN   Employer's Name  [employer] Telephone  [phone]    Employer Address  [employer] [employer] [facility] [employer] [address] [place]  [id number]   Date of Injury  11/14/2017       Hour of Injury  12:20 PM Date Employer Notified  11/14/2017 Last Day of Work after Injury or Occupational Disease  11/14/2017 Supervisor to Whom Injury Reported  EILEEN GARNICA   Address or Location of Accident (if applicable)  [address] [place] [address]   What were you doing at the time of accident? (if applicable)  GIVING INSULIN    How did this injury or occupational disease occur? Be specific and answer in detail. Use additional sheet if necessary)  FINGERSTICK WITH DIRTY NEEDLE   If you believe that you have an occupational disease, when did you first have knowledge of the disability and it relationship to your employment?  N/A Witnesses to the Accident  N/A     Nature of Injury or Occupational Disease  Contusion  Part(s) of Body Injured or Affected  Finger (R), N/A, N/A    I certify that the above is true and correct to the best of my knowledge and that I have provided this information in order to obtain the benefits of [place] or [place]).  I hereby authorize any physician, chiropractor, surgeon, practitioner, or other person, any hospital, including [facility] or [facility] hospital, any medical service organization, any insurance company, or other institution or organization to release to each other, any medical or other information, including benefits paid or payable, pertinent to this injury or disease, except information relative to diagnosis, treatment and/or counseling for AIDS, psychological conditions, alcohol or controlled substances, for which I must give specific authorization.  A Photostat of this authorization shall be as valid as the original.   Date  11/14/2017 Place  [facility] Employee’s Signature   THIS REPORT MUST BE COMPLETED AND MAILED WITHIN 3 WORKING DAYS OF TREATMENT   Place  [facility], EMERGENCY DEPT  Name of Facility   [facility]   Date  11/14/2017 Diagnosis  (Z57.8) Employee exposure to blood Is there evidence the injured employee was under the influence of alcohol and/or another controlled substance at the time of accident?   Hour  2:18 PM Description of Injury or Disease  Employee exposure to blood No   Treatment  Evaluation lab. Laboratory testing  Have you advised the patient to remain off work five days or more?         No   X-Ray Findings      If Yes   From Date    To Date      From information given by the employee, together with medical evidence, can you directly connect this injury or occupational disease as job incurred?  Yes If No, is the employee capable of: Full Duty  Yes Modified Duty      Is additional medical care by a physician indicated?  Yes  Comments:patient is to follow-up with occupational health tomorrow If Modified Duty, Specify any Limitations / Restrictions        Do you know of any previous injury or disease contributing to this condition or occupational disease?  No   Date  11/14/2017  "Print Doctor’s Name  Brady Carney I certify the employer’s copy of this form was mailed on:   Address  [address] [address] [phone] Insurer’s Use Only   [facility]  [address]    Provider’s Tax ID Number    Telephone  Dept: [phone]    Doctor’s Signature  e-BRADY Gray M.D. Degree   M.D    Original - TREATING PHYSICIAN OR CHIROPRACTOR   Pg 2-Insurer/TPA   Pg 3-Employer   Pg 4-Employee                                                                                                  Form C-4 (rev01/03)     BRIEF DESCRIPTION OF RIGHTS AND BENEFITS  (Pursuant to [place] 616C.050)    Notice of Injury or Occupational Disease (Incident Report Form C-1): If an injury or occupational disease (OD) arises out of and in the course of employment, you must provide written notice to your employer as soon as practicable, but no later than 7 days after the accident or OD. Your employer shall maintain a sufficient supply of the required forms.    Claim for Compensation (Form C-4): If medical treatment is sought, the form C-4 is available at the place of initial treatment. A completed \"Claim for Compensation\" (Form C-4) must be filed within 90 days after an accident or OD. The treating physician or chiropractor must, within 3 working days after treatment, complete and mail to the employer, the employer's insurer and third-party , the Claim for Compensation.    Medical Treatment: If you require medical treatment for your on-the-job injury or OD, you may be required to select a physician or chiropractor from a list provided by your workers’ compensation insurer, if it has contracted with an Organization for Managed Care (MCO) or Preferred Provider Organization (PPO) or providers of health care. If your employer has not entered into a contract with an MCO or PPO, you may select a physician or chiropractor from the Panel of Physicians and Chiropractors. Any medical costs " related to your industrial injury or OD will be paid by your insurer.    Temporary Total Disability (TTD): If your doctor has certified that you are unable to work for a period of at least 5 consecutive days, or 5 cumulative days in a 20-day period, or places restrictions on you that your employer does not accommodate, you may be entitled to TTD compensation.    Temporary Partial Disability (TPD): If the wage you receive upon reemployment is less than the compensation for TTD to which you are entitled, the insurer may be required to pay you TPD compensation to make up the difference. TPD can only be paid for a maximum of 24 months.    Permanent Partial Disability (PPD): When your medical condition is stable and there is an indication of a PPD as a result of your injury or OD, within 30 days, your insurer must arrange for an evaluation by a rating physician or chiropractor to determine the degree of your PPD. The amount of your PPD award depends on the date of injury, the results of the PPD evaluation and your age and wage.    Permanent Total Disability (PTD): If you are medically certified by a treating physician or chiropractor as permanently and totally disabled and have been granted a PTD status by your insurer, you are entitled to receive monthly benefits not to exceed 66 2/3% of your average monthly wage. The amount of your PTD payments is subject to reduction if you previously received a PPD award.    Vocational Rehabilitation Services: You may be eligible for vocational rehabilitation services if you are unable to return to the job due to a permanent physical impairment or permanent restrictions as a result of your injury or occupational disease.    Transportation and Per Janet Reimbursement: You may be eligible for travel expenses and per janet associated with medical treatment.  Reopening: You may be able to reopen your claim if your condition worsens after claim closure.    Appeal Process: If you disagree  with a written determination issued by the insurer or the insurer does not respond to your request, you may appeal to the Department of Administration, , by following the instructions contained in your determination letter. You must appeal the determination within 70 days from the date of the determination letter at [address], [address], [place], [place] [address], or [address], [address], [place], [place] [address]. If you disagree with the  decision, you may appeal to the Department of Administration, . You must file your appeal within 30 days from the date of the  decision letter at [address], [address], [place], [place] [address], or [address], [address], [place], [place] [address]. If you disagree with a decision of an , you may file a petition for judicial review with the District Court. You must do so within 30 days of the Appeal Officer’s decision. You may be represented by an  at your own expense or you may contact the [facility] for possible representation.    [place]  for Injured Workers (NAIW): If you disagree with a  decision, you may request that NAIW represent you without charge at an  Hearing. For information regarding denial of benefits, you may contact the [facility] at: [address], [address], [place] [address], [phone], or [address], [place] [address], [phone]    To File a Complaint with the Division: If you wish to file a complaint with the  of the Division of Industrial Relations (DIR), please contact the Workers’ Compensation Section, [address], [address], [place], [place] [address], telephone [phone], or [address], [address], [place], [place] [address], telephone [phone].    For assistance with Workers’ Compensation Issues: you may contact  the Office of the [facility] Consumer Health Assistance, [address] [place] [address], [address], [place], [place] [address], Toll Free 1-758.273.5108, Web site: http://govcha.[facility].[place].us, E-mail [email or web address].                                                                                                                                                                               __________________________________________________________________                                    _________________            Employee Name / Signature                                                                                                                            Date                                       D-2 (rev. 10/07)

## 2017-11-14 NOTE — ED NOTES
Chief Complaint   Patient presents with   • Body Fluid Exposure     Pt reports to have stuck by dirty needle/ right index finger/while at work     Charge RN aware.

## 2017-11-15 ENCOUNTER — OCCUPATIONAL MEDICINE (OUTPATIENT)
Dept: OCCUPATIONAL MEDICINE | Facility: CLINIC | Age: 27
End: 2017-11-15
Payer: COMMERCIAL

## 2017-11-15 VITALS
WEIGHT: 125 LBS | HEART RATE: 78 BPM | DIASTOLIC BLOOD PRESSURE: 70 MMHG | OXYGEN SATURATION: 100 % | BODY MASS INDEX: 20.09 KG/M2 | TEMPERATURE: 98.4 F | SYSTOLIC BLOOD PRESSURE: 116 MMHG | HEIGHT: 66 IN | RESPIRATION RATE: 12 BRPM
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
## 2017-11-15 DIAGNOSIS — Z77.21 EXPOSURE TO BODY FLUIDS BY CONTAMINATED HYPODERMIC NEEDLE STICK: ICD-10-CM

## 2017-11-15 PROCEDURE — 99201 PR OFFICE/OUTPT VISIT,NEW,LEVL I: CPT | Performed by: PREVENTIVE MEDICINE

## 2017-11-15 NOTE — ED NOTES
Lab results came back on source patient. erp to bedside to update pt and pt discharged home as ordered. Pt instructed to follow up with workmans comp. Pt verbalized understanding and left ambulating independently

## 2017-11-15 NOTE — LETTER
[address],   [address] [place] [address]  Phone:  [phone] - Fax:  [phone]   [facility] Progress Report and Disability Certification  Date of Service: 11/15/2017   No Show:  No  Date / Time of Next Visit: 1/11/2018 @10:35 AM   Claim Information   Patient Name: Patricia Novoa  Claim Number:     Employer: [employer]  Date of Injury: 11/14/2017     Insurer / TPA: Workers Choice  ID / SSN:     Occupation: RN  Diagnosis: The encounter diagnosis was Exposure to body fluids by contaminated hypodermic needle stick.    Medical Information   Related to Industrial Injury? Yes    Subjective Complaints:  DOI 11/14/2017. Mechanism of injury-needlestick injury with insulin syringes. 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 She has no complaints. Source is known to be hep C positive.   Objective Findings: Appearance: Well-developed, well-nourished.   Mental Status: Mood and Affect normal. Pleasant. Cooperative. Appropriate.   ENT: Oropharynx clear. Moist mucous membranes. Hearing normal.   Eyes: Pupils reactive. Conjunctiva normal. No scleral icterus.   Neck: Trachea Midline. No thyromegaly. No masses.  Cardiovascular: Normal rate. Regular rhythm. Normal heart sounds.   Chest: Effort normal. Breath sounds clear.   Skin: Skin is warm and dry. No rash.   Musculoskeletal: No significant injury.     Pre-Existing Condition(s):     Assessment:        Status: Additional Care Required  Permanent Disability:No    Plan:      Diagnostics:      Comments:       Disability Information   Status: Released to Full Duty    From:  11/15/2017  Through: 1/11/2018 Restrictions are:     Physical Restrictions   Sitting:    Standing:    Stooping:    Bending:      Squatting:    Walking:    Climbing:    Pushing:      Pulling:    Other:    Reaching Above Shoulder (L):   Reaching Above Shoulder (R):       Reaching Below Shoulder  (L):    Reaching Below Shoulder (R):      Not to exceed Weight Limits   Carrying(hrs):   Weight Limit(lb):   Lifting(hrs):   Weight  Limit(lb):     Comments:      Repetitive Actions   Hands: i.e. Fine Manipulations from Grasping:     Feet: i.e. Operating Foot Controls:     Driving / Operate Machinery:     Physician Name: Wero Gamez M.D. Physician Signature: WERO Leyva M.D. e-Signature: Dr. Marino Landon, Medical Director   Clinic Name / Location: [address],   [address]  [place] [address] Clinic Phone Number: Dept: [phone]   Appointment Time: 4:00 Pm Visit Start Time: 10:08 AM   Check-In Time:  9:53 Am Visit Discharge Time: 10:44 AM   Original-Treating Physician or Chiropractor    Page 2-Insurer/TPA    Page 3-Employer    Page 4-Employee

## 2017-11-15 NOTE — PROGRESS NOTES
"Subjective:      Patricia Novoa is a 27 y.o. female who presents with Follow-Up (WC DOI 11/15/2017 - Lab Results - ROOM 2)      DOI 11/14/2017. Mechanism of injury-needlestick injury with insulin syringes. 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 She has no complaints. Source is known to be hep C positive. Source MRN [id number]    HPI    ROS  Comprehensive medical history form reviewed. Pertinent positives and negatives included in HPI.    PFSH: reviewed in Epic    PMH:  has no past medical history on file.  MEDS:   Current Outpatient Prescriptions:   •  Misc. Devices (BREAST PUMP) Misc, 1 Each by Other route every day. Double Electric Pump, Medically Necessary  Z39.1, Disp: 1 Each, Rfl: 0  ALLERGIES: No Known Allergies  SURGHX: No past surgical history on file.  SOCHX:  reports that she has never smoked. She has never used smokeless tobacco.  Work Status: Employer and Job Title reviewed per [employer] form  FH: No pertinent hereditary disorders.        Objective:     /70   Pulse 78   Temp 36.9 °C (98.4 °F)   Resp 12   Ht 1.676 m (5' 6\")   Wt 56.7 kg (125 lb)   LMP 11/23/2016   SpO2 100%   BMI 20.18 kg/m²      Physical Exam    Appearance: Well-developed, well-nourished.   Mental Status: Mood and Affect normal. Pleasant. Cooperative. Appropriate.   ENT: Oropharynx clear. Moist mucous membranes. Hearing normal.   Eyes: Pupils reactive. Conjunctiva normal. No scleral icterus.   Neck: Trachea Midline. No thyromegaly. No masses.  Cardiovascular: Normal rate. Regular rhythm. Normal heart sounds.   Chest: Effort normal. Breath sounds clear.   Skin: Skin is warm and dry. No rash.   Musculoskeletal: No significant injury.         Assessment/Plan:     1. Exposure to body fluids by contaminated hypodermic needle stick  New to Occupational Health from emergency department   - HIV ANTIBODIES; Future  - HEP C VIRUS ANTIBODY; Future  - Worker labs negative  - Adequate " hepatitis B immunity  - Recheck 6 weeks

## 2017-11-29 LAB
AMP AMPHETAMINE: NORMAL
BAR BARBITURATES: NORMAL
BREATH ALCOHOL COMMENT: NORMAL
BZO BENZODIAZEPINES: NORMAL
COC COCAINE: NORMAL
INT CON NEG: NORMAL
INT CON POS: NORMAL
MDMA ECSTASY: NORMAL
MET METHAMPHETAMINES: NORMAL
MTD METHADONE: NORMAL
OPI OPIATES: NORMAL
OXY OXYCODONE: NORMAL
PCP PHENCYCLIDINE: NORMAL
POC BREATHALIZER: 0 PERCENT (ref 0–0.01)
POC URINE DRUG SCREEN OCDRS: NEGATIVE
THC: NORMAL

## 2017-11-29 NOTE — PROGRESS NOTES
Keyana was called out during business hours to [facility] for a post accident UDS and BAT on 11/14/17 for [employer].    UDS collected at 1:34 pm.  BAT collected at 13:28.

## 2018-01-11 ENCOUNTER — APPOINTMENT (OUTPATIENT)
Dept: OCCUPATIONAL MEDICINE | Facility: CLINIC | Age: 28
End: 2018-01-11
Payer: COMMERCIAL

## 2018-01-11 ENCOUNTER — HOSPITAL ENCOUNTER (OUTPATIENT)
Dept: LAB | Facility: MEDICAL CENTER | Age: 28
End: 2018-01-11
Attending: PREVENTIVE MEDICINE
Payer: COMMERCIAL

## 2018-01-11 ENCOUNTER — EH NON-PROVIDER (OUTPATIENT)
Dept: OCCUPATIONAL MEDICINE | Facility: CLINIC | Age: 28
End: 2018-01-11

## 2018-01-11 DIAGNOSIS — Z29.89 NEED FOR ISOLATION: ICD-10-CM
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
## 2018-01-11 DIAGNOSIS — Z77.21 EXPOSURE TO BODY FLUIDS BY CONTAMINATED HYPODERMIC NEEDLE STICK: ICD-10-CM

## 2018-01-11 LAB
HCV AB SER QL: NEGATIVE
HIV 1+2 AB+HIV1 P24 AG SERPL QL IA: NON REACTIVE

## 2018-01-11 PROCEDURE — 86803 HEPATITIS C AB TEST: CPT

## 2018-01-11 PROCEDURE — 94375 RESPIRATORY FLOW VOLUME LOOP: CPT | Performed by: PREVENTIVE MEDICINE

## 2018-01-11 PROCEDURE — 87389 HIV-1 AG W/HIV-1&-2 AB AG IA: CPT

## 2018-01-11 PROCEDURE — 36415 COLL VENOUS BLD VENIPUNCTURE: CPT

## 2018-01-12 ENCOUNTER — EH NON-PROVIDER (OUTPATIENT)
Dept: OCCUPATIONAL MEDICINE | Facility: CLINIC | Age: 28
End: 2018-01-12

## 2018-01-12 DIAGNOSIS — Z01.89 RESPIRATORY CLEARANCE EXAMINATION, ENCOUNTER FOR: ICD-10-CM

## 2018-01-12 PROCEDURE — 8916 PR CLEARANCE ONLY: Performed by: PREVENTIVE MEDICINE

## 2018-01-17 ENCOUNTER — OCCUPATIONAL MEDICINE (OUTPATIENT)
Dept: OCCUPATIONAL MEDICINE | Facility: CLINIC | Age: 28
End: 2018-01-17
Payer: COMMERCIAL

## 2018-01-17 VITALS
HEIGHT: 66 IN | OXYGEN SATURATION: 97 % | DIASTOLIC BLOOD PRESSURE: 64 MMHG | TEMPERATURE: 96.8 F | HEART RATE: 74 BPM | SYSTOLIC BLOOD PRESSURE: 114 MMHG | WEIGHT: 118 LBS | BODY MASS INDEX: 18.96 KG/M2 | RESPIRATION RATE: 14 BRPM
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
## 2018-01-17 DIAGNOSIS — Z77.21 EXPOSURE TO BODY FLUIDS BY CONTAMINATED HYPODERMIC NEEDLE STICK: ICD-10-CM

## 2018-01-17 PROCEDURE — 99212 OFFICE O/P EST SF 10 MIN: CPT | Performed by: PREVENTIVE MEDICINE

## 2018-01-17 NOTE — LETTER
[address],   [address] [place] [address]  Phone:  [phone] - Fax:  [phone]   [facility] Progress Report and Disability Certification  Date of Service: 1/17/2018   No Show:  No  Date / Time of Next Visit: 5/3/2018 @ 9:30 AM    Claim Information   Patient Name: Patricia Novoa  Claim Number:     Employer: [employer]  Date of Injury: 11/14/2017     Insurer / TPA: Workers Choice  ID / SSN:     Occupation: RN  Diagnosis: The encounter diagnosis was Exposure to body fluids by contaminated hypodermic needle stick.    Medical Information   Related to Industrial Injury? Yes    Subjective Complaints:  DOI 11/14/2017. Mechanism of injury-needlestick injury with insulin syringes. 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 She has no complaints. Source is known to be hep C positive. Source MRN [id number]. 2 months since exposure.   Objective Findings: Labs negative.   Pre-Existing Condition(s):     Assessment:        Status: Additional Care Required  Permanent Disability:No    Plan:      Diagnostics:      Comments:       Disability Information   Status: Released to Full Duty    From:  1/17/2018  Through: 5/3/2018 Restrictions are:     Physical Restrictions   Sitting:    Standing:    Stooping:    Bending:      Squatting:    Walking:    Climbing:    Pushing:      Pulling:    Other:    Reaching Above Shoulder (L):   Reaching Above Shoulder (R):       Reaching Below Shoulder (L):    Reaching Below Shoulder (R):      Not to exceed Weight Limits   Carrying(hrs):   Weight Limit(lb):   Lifting(hrs):   Weight  Limit(lb):     Comments:      Repetitive Actions   Hands: i.e. Fine Manipulations from Grasping:     Feet: i.e. Operating Foot Controls:     Driving / Operate Machinery:     Physician Name: Wero Gamez M.D. Physician Signature: WERO Leyva M.D. e-Signature: Dr. Marino Landon, Medical  Director   Clinic Name / Location: [address],   [address]  [place] [address] Clinic Phone Number: Dept: [phone]   Appointment Time: 11:00 Am Visit Start Time: 11:02 AM   Check-In Time:  10:51 Am Visit Discharge Time:  11:27 AM    Original-Treating Physician or Chiropractor    Page 2-Insurer/TPA    Page 3-Employer    Page 4-Employee

## 2018-01-17 NOTE — PROGRESS NOTES
"Subjective:      Patricia Novoa is a 27 y.o. female who presents with Follow-Up (WC DOI 11/15/2017 - R Finger - Same - ROOM 3)      DOI 11/14/2017. Mechanism of injury-needlestick injury with insulin syringes. 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 She has no complaints. Source is known to be hep C positive. Source MRN [id number]. 2 months since exposure.     HPI    ROS  PFSH:  WORK STATUS: Full duty  PMH:  has no past medical history on file.  MEDS:   Current Outpatient Prescriptions:   •  Misc. Devices (BREAST PUMP) Misc, 1 Each by Other route every day. Double Electric Pump, Medically Necessary  Z39.1, Disp: 1 Each, Rfl: 0       Objective:     /64   Pulse 74   Temp 36 °C (96.8 °F)   Resp 14   Ht 1.676 m (5' 6\")   Wt 53.5 kg (118 lb)   LMP 11/23/2016   SpO2 97%   BMI 19.05 kg/m²      Physical Exam    Labs negative.       Assessment/Plan:     1. Exposure to body fluids by contaminated hypodermic needle stick    - HEP C VIRUS ANTIBODY; Future  - HIV ANTIBODIES; Future  - Regular work  - Recheck 3 months      "

## 2018-09-02 ENCOUNTER — DOCUMENTATION (OUTPATIENT)
Dept: OCCUPATIONAL MEDICINE | Facility: CLINIC | Age: 28
End: 2018-09-02

## 2018-09-27 ENCOUNTER — IMMUNIZATION (OUTPATIENT)
Dept: OCCUPATIONAL MEDICINE | Facility: CLINIC | Age: 28
End: 2018-09-27

## 2018-09-27 DIAGNOSIS — Z23 NEED FOR VACCINATION: ICD-10-CM

## 2018-09-27 PROCEDURE — 90686 IIV4 VACC NO PRSV 0.5 ML IM: CPT | Performed by: PREVENTIVE MEDICINE

## 2018-09-29 ENCOUNTER — EH NON-PROVIDER (OUTPATIENT)
Dept: OCCUPATIONAL MEDICINE | Facility: CLINIC | Age: 28
End: 2018-09-29

## 2018-09-29 DIAGNOSIS — Z02.89 ENCOUNTER FOR OCCUPATIONAL HEALTH EXAMINATION INVOLVING RESPIRATOR: ICD-10-CM

## 2018-09-29 PROCEDURE — 94375 RESPIRATORY FLOW VOLUME LOOP: CPT | Performed by: PREVENTIVE MEDICINE

## 2019-02-24 NOTE — ED NOTES
Break RN  Occupational health at bedside.   Pt resting comfortably and independently repositioned in stretcher with bed locked in lowest position for safety. NAD noted at this time. Respirations even and unlabored and visible chest rise noted.  Patient offered bathroom assistance and denies need at this time. Pt instructed to call if assistance is needed. Pt on continuous cardiac, BP, and O2 monitoring. Call light within reach. No needs at this time. Will continue to monitor.

## 2020-01-16 ENCOUNTER — HOSPITAL ENCOUNTER (INPATIENT)
Dept: HOSPITAL 8 - LDIP | Age: 30
LOS: 2 days | Discharge: HOME | End: 2020-01-18
Attending: OBSTETRICS & GYNECOLOGY | Admitting: OBSTETRICS & GYNECOLOGY
Payer: COMMERCIAL

## 2020-01-16 VITALS — BODY MASS INDEX: 26.22 KG/M2 | WEIGHT: 163.14 LBS | HEIGHT: 66 IN

## 2020-01-16 VITALS — SYSTOLIC BLOOD PRESSURE: 116 MMHG | DIASTOLIC BLOOD PRESSURE: 67 MMHG

## 2020-01-16 DIAGNOSIS — Z3A.39: ICD-10-CM

## 2020-01-16 DIAGNOSIS — Z82.3: ICD-10-CM

## 2020-01-16 DIAGNOSIS — Z83.3: ICD-10-CM

## 2020-01-16 DIAGNOSIS — G43.909: ICD-10-CM

## 2020-01-16 DIAGNOSIS — Z82.49: ICD-10-CM

## 2020-01-16 LAB
BASOPHILS # BLD AUTO: 0.03 X10^3/UL (ref 0–0.1)
BASOPHILS NFR BLD AUTO: 0 % (ref 0–1)
EOSINOPHIL # BLD AUTO: 0.13 X10^3/UL (ref 0–0.4)
EOSINOPHIL NFR BLD AUTO: 2 % (ref 1–7)
ERYTHROCYTE [DISTWIDTH] IN BLOOD BY AUTOMATED COUNT: 13.3 % (ref 9.6–15.2)
LYMPHOCYTES # BLD AUTO: 2.1 X10^3/UL (ref 1–3.4)
LYMPHOCYTES NFR BLD AUTO: 28 % (ref 22–44)
MCH RBC QN AUTO: 29.8 PG (ref 27–34.8)
MCHC RBC AUTO-ENTMCNC: 33.4 G/DL (ref 32.4–35.8)
MCV RBC AUTO: 89.4 FL (ref 80–100)
MD: NO
MONOCYTES # BLD AUTO: 0.51 X10^3/UL (ref 0.2–0.8)
MONOCYTES NFR BLD AUTO: 7 % (ref 2–9)
NEUTROPHILS # BLD AUTO: 4.83 X10^3/UL (ref 1.8–6.8)
NEUTROPHILS NFR BLD AUTO: 64 % (ref 42–75)
PLATELET # BLD AUTO: 218 X10^3/UL (ref 130–400)
PMV BLD AUTO: 8.3 FL (ref 7.4–10.4)
RBC # BLD AUTO: 4.24 X10^6/UL (ref 3.82–5.3)

## 2020-01-16 PROCEDURE — 85025 COMPLETE CBC W/AUTO DIFF WBC: CPT

## 2020-01-16 PROCEDURE — 86870 RBC ANTIBODY IDENTIFICATION: CPT

## 2020-01-16 PROCEDURE — 76815 OB US LIMITED FETUS(S): CPT

## 2020-01-16 PROCEDURE — 86900 BLOOD TYPING SEROLOGIC ABO: CPT

## 2020-01-16 PROCEDURE — 36415 COLL VENOUS BLD VENIPUNCTURE: CPT

## 2020-01-16 PROCEDURE — 82803 BLOOD GASES ANY COMBINATION: CPT

## 2020-01-16 PROCEDURE — 86923 COMPATIBILITY TEST ELECTRIC: CPT

## 2020-01-16 PROCEDURE — 86850 RBC ANTIBODY SCREEN: CPT

## 2020-01-16 PROCEDURE — 86592 SYPHILIS TEST NON-TREP QUAL: CPT

## 2020-01-16 RX ADMIN — SODIUM CHLORIDE, SODIUM LACTATE, POTASSIUM CHLORIDE, AND CALCIUM CHLORIDE SCH MLS/HR: .6; .31; .03; .02 INJECTION, SOLUTION INTRAVENOUS at 05:36

## 2020-01-16 RX ADMIN — SODIUM CHLORIDE, SODIUM LACTATE, POTASSIUM CHLORIDE, AND CALCIUM CHLORIDE SCH MLS/HR: .6; .31; .03; .02 INJECTION, SOLUTION INTRAVENOUS at 13:30

## 2020-01-16 RX ADMIN — SODIUM CHLORIDE, SODIUM LACTATE, POTASSIUM CHLORIDE, AND CALCIUM CHLORIDE SCH MLS/HR: .6; .31; .03; .02 INJECTION, SOLUTION INTRAVENOUS at 19:55

## 2020-01-16 RX ADMIN — SODIUM CHLORIDE, SODIUM LACTATE, POTASSIUM CHLORIDE, AND CALCIUM CHLORIDE SCH MLS/HR: .6; .31; .03; .02 INJECTION, SOLUTION INTRAVENOUS at 12:07

## 2020-01-17 VITALS — DIASTOLIC BLOOD PRESSURE: 71 MMHG | SYSTOLIC BLOOD PRESSURE: 105 MMHG

## 2020-01-17 VITALS — DIASTOLIC BLOOD PRESSURE: 73 MMHG | SYSTOLIC BLOOD PRESSURE: 109 MMHG

## 2020-01-17 VITALS — DIASTOLIC BLOOD PRESSURE: 70 MMHG | SYSTOLIC BLOOD PRESSURE: 108 MMHG

## 2020-01-17 VITALS — DIASTOLIC BLOOD PRESSURE: 71 MMHG | SYSTOLIC BLOOD PRESSURE: 110 MMHG

## 2020-01-17 LAB
BASOPHILS # BLD AUTO: 0.01 X10^3/UL (ref 0–0.1)
BASOPHILS NFR BLD AUTO: 0 % (ref 0–1)
EOSINOPHIL # BLD AUTO: 0.13 X10^3/UL (ref 0–0.4)
EOSINOPHIL NFR BLD AUTO: 1 % (ref 1–7)
ERYTHROCYTE [DISTWIDTH] IN BLOOD BY AUTOMATED COUNT: 12.9 % (ref 9.6–15.2)
LYMPHOCYTES # BLD AUTO: 1.55 X10^3/UL (ref 1–3.4)
LYMPHOCYTES NFR BLD AUTO: 12 % (ref 22–44)
MCH RBC QN AUTO: 30.1 PG (ref 27–34.8)
MCHC RBC AUTO-ENTMCNC: 33.8 G/DL (ref 32.4–35.8)
MCV RBC AUTO: 88.9 FL (ref 80–100)
MD: (no result)
MONOCYTES # BLD AUTO: 0.9 X10^3/UL (ref 0.2–0.8)
MONOCYTES NFR BLD AUTO: 7 % (ref 2–9)
NEUTROPHILS # BLD AUTO: 10.82 X10^3/UL (ref 1.8–6.8)
NEUTROPHILS NFR BLD AUTO: 81 % (ref 42–75)
PLATELET # BLD AUTO: 188 X10^3/UL (ref 130–400)
PMV BLD AUTO: 8.3 FL (ref 7.4–10.4)
RBC # BLD AUTO: 3.71 X10^6/UL (ref 3.82–5.3)

## 2020-01-17 PROCEDURE — 3E0R3BZ INTRODUCTION OF ANESTHETIC AGENT INTO SPINAL CANAL, PERCUTANEOUS APPROACH: ICD-10-PCS | Performed by: OBSTETRICS & GYNECOLOGY

## 2020-01-17 PROCEDURE — 10907ZC DRAINAGE OF AMNIOTIC FLUID, THERAPEUTIC FROM PRODUCTS OF CONCEPTION, VIA NATURAL OR ARTIFICIAL OPENING: ICD-10-PCS | Performed by: OBSTETRICS & GYNECOLOGY

## 2020-01-17 PROCEDURE — 0KQM0ZZ REPAIR PERINEUM MUSCLE, OPEN APPROACH: ICD-10-PCS | Performed by: OBSTETRICS & GYNECOLOGY

## 2020-01-17 PROCEDURE — 00HU33Z INSERTION OF INFUSION DEVICE INTO SPINAL CANAL, PERCUTANEOUS APPROACH: ICD-10-PCS | Performed by: OBSTETRICS & GYNECOLOGY

## 2020-01-17 RX ADMIN — OXYCODONE HYDROCHLORIDE AND ACETAMINOPHEN PRN TAB: 5; 325 TABLET ORAL at 03:08

## 2020-01-17 RX ADMIN — OXYCODONE HYDROCHLORIDE AND ACETAMINOPHEN PRN TAB: 5; 325 TABLET ORAL at 15:04

## 2020-01-17 RX ADMIN — IBUPROFEN PRN MG: 600 TABLET ORAL at 09:58

## 2020-01-17 RX ADMIN — PRENATAL VIT W/ FE FUMARATE-FA TAB 27-0.8 MG SCH EACH: 27-0.8 TAB at 09:58

## 2020-01-17 RX ADMIN — IBUPROFEN PRN MG: 600 TABLET ORAL at 02:45

## 2020-01-17 RX ADMIN — DOCUSATE SODIUM PRN MG: 100 CAPSULE, LIQUID FILLED ORAL at 19:24

## 2020-01-17 RX ADMIN — IBUPROFEN PRN MG: 600 TABLET ORAL at 19:24

## 2020-01-17 RX ADMIN — OXYCODONE HYDROCHLORIDE AND ACETAMINOPHEN PRN TAB: 5; 325 TABLET ORAL at 07:01

## 2020-01-17 RX ADMIN — OXYCODONE HYDROCHLORIDE AND ACETAMINOPHEN PRN TAB: 5; 325 TABLET ORAL at 19:25

## 2020-01-17 RX ADMIN — Medication SCH MLS/HR: at 02:37

## 2020-01-17 RX ADMIN — Medication SCH MLS/HR: at 06:45

## 2020-01-17 RX ADMIN — DOCUSATE SODIUM PRN MG: 100 CAPSULE, LIQUID FILLED ORAL at 09:58

## 2020-01-18 VITALS — DIASTOLIC BLOOD PRESSURE: 77 MMHG | SYSTOLIC BLOOD PRESSURE: 115 MMHG

## 2020-01-18 RX ADMIN — IBUPROFEN PRN MG: 600 TABLET ORAL at 08:27

## 2020-01-18 RX ADMIN — OXYCODONE HYDROCHLORIDE AND ACETAMINOPHEN PRN TAB: 5; 325 TABLET ORAL at 05:44

## 2020-01-18 RX ADMIN — IBUPROFEN PRN MG: 600 TABLET ORAL at 02:04

## 2020-01-18 RX ADMIN — Medication SCH MLS/HR: at 06:49

## 2020-01-18 RX ADMIN — DOCUSATE SODIUM PRN MG: 100 CAPSULE, LIQUID FILLED ORAL at 08:27

## 2020-01-18 RX ADMIN — PRENATAL VIT W/ FE FUMARATE-FA TAB 27-0.8 MG SCH EACH: 27-0.8 TAB at 08:27

## 2020-07-11 ENCOUNTER — HOSPITAL ENCOUNTER (OUTPATIENT)
Dept: HOSPITAL 8 - LAB | Age: 30
Discharge: HOME | End: 2020-07-11
Attending: OBSTETRICS & GYNECOLOGY
Payer: COMMERCIAL

## 2020-07-11 DIAGNOSIS — Z30.9: Primary | ICD-10-CM

## 2020-07-11 PROCEDURE — 84702 CHORIONIC GONADOTROPIN TEST: CPT

## 2020-07-11 PROCEDURE — 36415 COLL VENOUS BLD VENIPUNCTURE: CPT

## 2022-11-10 ENCOUNTER — APPOINTMENT (RX ONLY)
Dept: URBAN - METROPOLITAN AREA CLINIC 6 | Facility: CLINIC | Age: 32
Setting detail: DERMATOLOGY
End: 2022-11-10

## 2022-11-10 DIAGNOSIS — D22 MELANOCYTIC NEVI: ICD-10-CM

## 2022-11-10 PROBLEM — D22.5 MELANOCYTIC NEVI OF TRUNK: Status: ACTIVE | Noted: 2022-11-10

## 2022-11-10 PROCEDURE — ? DIAGNOSIS COMMENT

## 2022-11-10 PROCEDURE — ? COUNSELING

## 2022-11-10 PROCEDURE — 99202 OFFICE O/P NEW SF 15 MIN: CPT

## 2022-11-10 PROCEDURE — ? ADDITIONAL NOTES

## 2022-11-10 ASSESSMENT — LOCATION DETAILED DESCRIPTION DERM: LOCATION DETAILED: LEFT LATERAL ABDOMEN

## 2022-11-10 ASSESSMENT — LOCATION ZONE DERM: LOCATION ZONE: TRUNK

## 2022-11-10 ASSESSMENT — LOCATION SIMPLE DESCRIPTION DERM: LOCATION SIMPLE: ABDOMEN

## 2022-11-10 NOTE — PROCEDURE: DIAGNOSIS COMMENT
Detail Level: Zone
Comment: Appears most consistent with an irritated/traumatized nevus with hemorrhagic crusting, patient denies any previous trauma. No concerning features under dermoscopy.
Render Risk Assessment In Note?: no

## 2022-11-10 NOTE — PROCEDURE: ADDITIONAL NOTES
Render Risk Assessment In Note?: no
Detail Level: Detailed
Additional Notes: Recommended patient use 50/50 OTC hydrocortisone cream and Vaseline applied to the area twice a day until healed. Will recheck at f/u visit in three weeks, if failure of inflammation/crusting to resolve, will plan for biopsy at that time.